# Patient Record
Sex: FEMALE | Race: WHITE | NOT HISPANIC OR LATINO | Employment: FULL TIME | URBAN - METROPOLITAN AREA
[De-identification: names, ages, dates, MRNs, and addresses within clinical notes are randomized per-mention and may not be internally consistent; named-entity substitution may affect disease eponyms.]

---
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
## 2022-04-19 ENCOUNTER — EMPLOYEE HEALTH (OUTPATIENT)
Dept: OCCUPATIONAL MEDICINE | Facility: CLINIC | Age: 22
End: 2022-04-19
Payer: COMMERCIAL

## 2022-04-19 ENCOUNTER — HOSPITAL ENCOUNTER (OUTPATIENT)
Facility: MEDICAL CENTER | Age: 22
End: 2022-04-19
Attending: PREVENTIVE MEDICINE
Payer: COMMERCIAL

## 2022-04-19 ENCOUNTER — EH NON-PROVIDER (OUTPATIENT)
Dept: OCCUPATIONAL MEDICINE | Facility: CLINIC | Age: 22
End: 2022-04-19
Payer: COMMERCIAL

## 2022-04-19 DIAGNOSIS — Z02.89 ENCOUNTER FOR OCCUPATIONAL HEALTH ASSESSMENT: Primary | ICD-10-CM

## 2022-04-19 DIAGNOSIS — Z02.1 PRE-EMPLOYMENT HEALTH SCREENING EXAMINATION: ICD-10-CM

## 2022-04-19 DIAGNOSIS — Z02.89 ENCOUNTER FOR OCCUPATIONAL HEALTH ASSESSMENT: ICD-10-CM

## 2022-04-19 LAB
AMP AMPHETAMINE: NORMAL
BAR BARBITURATES: NORMAL
BZO BENZODIAZEPINES: NORMAL
COC COCAINE: NORMAL
INT CON NEG: NORMAL
INT CON POS: NORMAL
MDMA ECSTASY: NORMAL
MET METHAMPHETAMINES: NORMAL
MTD METHADONE: NORMAL
OPI OPIATES: NORMAL
OXY OXYCODONE: NORMAL
PCP PHENCYCLIDINE: NORMAL
POC URINE DRUG SCREEN OCDRS: NORMAL
THC: NORMAL

## 2022-04-19 PROCEDURE — 86480 TB TEST CELL IMMUN MEASURE: CPT | Performed by: NURSE PRACTITIONER

## 2022-04-19 PROCEDURE — 8915 PR COMPREHENSIVE PHYSICAL: Performed by: NURSE PRACTITIONER

## 2022-04-19 PROCEDURE — 80305 DRUG TEST PRSMV DIR OPT OBS: CPT | Performed by: NURSE PRACTITIONER

## 2022-04-20 LAB
GAMMA INTERFERON BACKGROUND BLD IA-ACNC: 0.05 IU/ML
M TB IFN-G BLD-IMP: NEGATIVE
M TB IFN-G CD4+ BCKGRND COR BLD-ACNC: 0 IU/ML
MITOGEN IGNF BCKGRD COR BLD-ACNC: >10 IU/ML
QFT TB2 - NIL TBQ2: 0 IU/ML

## 2022-05-16 ENCOUNTER — TELEPHONE (OUTPATIENT)
Dept: SCHEDULING | Facility: IMAGING CENTER | Age: 22
End: 2022-05-16
Payer: COMMERCIAL

## 2022-05-17 ENCOUNTER — OFFICE VISIT (OUTPATIENT)
Dept: MEDICAL GROUP | Facility: LAB | Age: 22
End: 2022-05-17
Payer: COMMERCIAL

## 2022-05-17 ENCOUNTER — HOSPITAL ENCOUNTER (OUTPATIENT)
Dept: LAB | Facility: MEDICAL CENTER | Age: 22
End: 2022-05-17
Attending: PHYSICIAN ASSISTANT
Payer: COMMERCIAL

## 2022-05-17 VITALS
WEIGHT: 142.2 LBS | SYSTOLIC BLOOD PRESSURE: 132 MMHG | OXYGEN SATURATION: 99 % | BODY MASS INDEX: 23.69 KG/M2 | DIASTOLIC BLOOD PRESSURE: 72 MMHG | RESPIRATION RATE: 16 BRPM | TEMPERATURE: 97 F | HEART RATE: 94 BPM | HEIGHT: 65 IN

## 2022-05-17 DIAGNOSIS — N92.0 MENORRHAGIA WITH REGULAR CYCLE: ICD-10-CM

## 2022-05-17 DIAGNOSIS — Z76.89 ENCOUNTER TO ESTABLISH CARE: ICD-10-CM

## 2022-05-17 DIAGNOSIS — R55 SYNCOPE, UNSPECIFIED SYNCOPE TYPE: Primary | ICD-10-CM

## 2022-05-17 DIAGNOSIS — F50.01 ANOREXIA NERVOSA, RESTRICTING TYPE: ICD-10-CM

## 2022-05-17 DIAGNOSIS — F33.1 MODERATE EPISODE OF RECURRENT MAJOR DEPRESSIVE DISORDER (HCC): ICD-10-CM

## 2022-05-17 DIAGNOSIS — R53.83 FATIGUE, UNSPECIFIED TYPE: ICD-10-CM

## 2022-05-17 PROBLEM — N92.1 MENORRHAGIA WITH IRREGULAR CYCLE: Status: ACTIVE | Noted: 2022-05-17

## 2022-05-17 LAB
ALBUMIN SERPL BCP-MCNC: 4.5 G/DL (ref 3.2–4.9)
ALBUMIN/GLOB SERPL: 1.9 G/DL
ALP SERPL-CCNC: 71 U/L (ref 30–99)
ALT SERPL-CCNC: 6 U/L (ref 2–50)
ANION GAP SERPL CALC-SCNC: 13 MMOL/L (ref 7–16)
AST SERPL-CCNC: 8 U/L (ref 12–45)
BASOPHILS # BLD AUTO: 0.9 % (ref 0–1.8)
BASOPHILS # BLD: 0.05 K/UL (ref 0–0.12)
BILIRUB SERPL-MCNC: 0.5 MG/DL (ref 0.1–1.5)
BUN SERPL-MCNC: 8 MG/DL (ref 8–22)
CALCIUM SERPL-MCNC: 9.2 MG/DL (ref 8.5–10.5)
CHLORIDE SERPL-SCNC: 106 MMOL/L (ref 96–112)
CO2 SERPL-SCNC: 22 MMOL/L (ref 20–33)
CREAT SERPL-MCNC: 0.77 MG/DL (ref 0.5–1.4)
EOSINOPHIL # BLD AUTO: 0.22 K/UL (ref 0–0.51)
EOSINOPHIL NFR BLD: 4.1 % (ref 0–6.9)
ERYTHROCYTE [DISTWIDTH] IN BLOOD BY AUTOMATED COUNT: 36.7 FL (ref 35.9–50)
FERRITIN SERPL-MCNC: 6.8 NG/ML (ref 10–291)
GFR SERPLBLD CREATININE-BSD FMLA CKD-EPI: 113 ML/MIN/1.73 M 2
GLOBULIN SER CALC-MCNC: 2.4 G/DL (ref 1.9–3.5)
GLUCOSE SERPL-MCNC: 80 MG/DL (ref 65–99)
HCT VFR BLD AUTO: 29.2 % (ref 37–47)
HGB BLD-MCNC: 9.3 G/DL (ref 12–16)
IMM GRANULOCYTES # BLD AUTO: 0 K/UL (ref 0–0.11)
IMM GRANULOCYTES NFR BLD AUTO: 0 % (ref 0–0.9)
IRON SATN MFR SERPL: 3 % (ref 15–55)
IRON SERPL-MCNC: 12 UG/DL (ref 40–170)
LYMPHOCYTES # BLD AUTO: 1.53 K/UL (ref 1–4.8)
LYMPHOCYTES NFR BLD: 28.2 % (ref 22–41)
MCH RBC QN AUTO: 25.8 PG (ref 27–33)
MCHC RBC AUTO-ENTMCNC: 31.8 G/DL (ref 33.6–35)
MCV RBC AUTO: 80.9 FL (ref 81.4–97.8)
MONOCYTES # BLD AUTO: 0.53 K/UL (ref 0–0.85)
MONOCYTES NFR BLD AUTO: 9.8 % (ref 0–13.4)
NEUTROPHILS # BLD AUTO: 3.1 K/UL (ref 2–7.15)
NEUTROPHILS NFR BLD: 57 % (ref 44–72)
NRBC # BLD AUTO: 0 K/UL
NRBC BLD-RTO: 0 /100 WBC
PLATELET # BLD AUTO: 310 K/UL (ref 164–446)
PMV BLD AUTO: 11.5 FL (ref 9–12.9)
POTASSIUM SERPL-SCNC: 3.4 MMOL/L (ref 3.6–5.5)
PROT SERPL-MCNC: 6.9 G/DL (ref 6–8.2)
RBC # BLD AUTO: 3.61 M/UL (ref 4.2–5.4)
SODIUM SERPL-SCNC: 141 MMOL/L (ref 135–145)
T3FREE SERPL-MCNC: 3.79 PG/ML (ref 2–4.4)
T4 FREE SERPL-MCNC: 1.52 NG/DL (ref 0.93–1.7)
TIBC SERPL-MCNC: 375 UG/DL (ref 250–450)
TSH SERPL DL<=0.005 MIU/L-ACNC: 1.79 UIU/ML (ref 0.38–5.33)
UIBC SERPL-MCNC: 363 UG/DL (ref 110–370)
WBC # BLD AUTO: 5.4 K/UL (ref 4.8–10.8)

## 2022-05-17 PROCEDURE — 84443 ASSAY THYROID STIM HORMONE: CPT

## 2022-05-17 PROCEDURE — 99204 OFFICE O/P NEW MOD 45 MIN: CPT | Performed by: PHYSICIAN ASSISTANT

## 2022-05-17 PROCEDURE — 82306 VITAMIN D 25 HYDROXY: CPT

## 2022-05-17 PROCEDURE — 85025 COMPLETE CBC W/AUTO DIFF WBC: CPT

## 2022-05-17 PROCEDURE — 84439 ASSAY OF FREE THYROXINE: CPT

## 2022-05-17 PROCEDURE — 36415 COLL VENOUS BLD VENIPUNCTURE: CPT

## 2022-05-17 PROCEDURE — 84481 FREE ASSAY (FT-3): CPT

## 2022-05-17 PROCEDURE — 82728 ASSAY OF FERRITIN: CPT

## 2022-05-17 PROCEDURE — 83550 IRON BINDING TEST: CPT

## 2022-05-17 PROCEDURE — 80053 COMPREHEN METABOLIC PANEL: CPT

## 2022-05-17 PROCEDURE — 93000 ELECTROCARDIOGRAM COMPLETE: CPT | Performed by: PHYSICIAN ASSISTANT

## 2022-05-17 PROCEDURE — 83540 ASSAY OF IRON: CPT

## 2022-05-17 RX ORDER — ALPRAZOLAM 0.5 MG/1
0.5 TABLET ORAL 3 TIMES DAILY PRN
COMMUNITY

## 2022-05-17 RX ORDER — MOXIFLOXACIN 5 MG/ML
SOLUTION/ DROPS OPHTHALMIC 3 TIMES DAILY
COMMUNITY
Start: 2022-05-16 | End: 2022-05-23

## 2022-05-17 SDOH — ECONOMIC STABILITY: INCOME INSECURITY: IN THE LAST 12 MONTHS, WAS THERE A TIME WHEN YOU WERE NOT ABLE TO PAY THE MORTGAGE OR RENT ON TIME?: NO

## 2022-05-17 SDOH — ECONOMIC STABILITY: HOUSING INSECURITY
IN THE LAST 12 MONTHS, WAS THERE A TIME WHEN YOU DID NOT HAVE A STEADY PLACE TO SLEEP OR SLEPT IN A SHELTER (INCLUDING NOW)?: NO

## 2022-05-17 SDOH — ECONOMIC STABILITY: TRANSPORTATION INSECURITY
IN THE PAST 12 MONTHS, HAS THE LACK OF TRANSPORTATION KEPT YOU FROM MEDICAL APPOINTMENTS OR FROM GETTING MEDICATIONS?: NO

## 2022-05-17 SDOH — HEALTH STABILITY: MENTAL HEALTH
STRESS IS WHEN SOMEONE FEELS TENSE, NERVOUS, ANXIOUS, OR CAN'T SLEEP AT NIGHT BECAUSE THEIR MIND IS TROUBLED. HOW STRESSED ARE YOU?: VERY MUCH

## 2022-05-17 SDOH — HEALTH STABILITY: PHYSICAL HEALTH: ON AVERAGE, HOW MANY MINUTES DO YOU ENGAGE IN EXERCISE AT THIS LEVEL?: 30 MIN

## 2022-05-17 SDOH — HEALTH STABILITY: PHYSICAL HEALTH: ON AVERAGE, HOW MANY DAYS PER WEEK DO YOU ENGAGE IN MODERATE TO STRENUOUS EXERCISE (LIKE A BRISK WALK)?: 3 DAYS

## 2022-05-17 SDOH — ECONOMIC STABILITY: TRANSPORTATION INSECURITY
IN THE PAST 12 MONTHS, HAS LACK OF TRANSPORTATION KEPT YOU FROM MEETINGS, WORK, OR FROM GETTING THINGS NEEDED FOR DAILY LIVING?: NO

## 2022-05-17 SDOH — ECONOMIC STABILITY: HOUSING INSECURITY: IN THE LAST 12 MONTHS, HOW MANY PLACES HAVE YOU LIVED?: 2

## 2022-05-17 SDOH — ECONOMIC STABILITY: FOOD INSECURITY: WITHIN THE PAST 12 MONTHS, THE FOOD YOU BOUGHT JUST DIDN'T LAST AND YOU DIDN'T HAVE MONEY TO GET MORE.: NEVER TRUE

## 2022-05-17 SDOH — ECONOMIC STABILITY: TRANSPORTATION INSECURITY
IN THE PAST 12 MONTHS, HAS LACK OF RELIABLE TRANSPORTATION KEPT YOU FROM MEDICAL APPOINTMENTS, MEETINGS, WORK OR FROM GETTING THINGS NEEDED FOR DAILY LIVING?: NO

## 2022-05-17 SDOH — ECONOMIC STABILITY: INCOME INSECURITY: HOW HARD IS IT FOR YOU TO PAY FOR THE VERY BASICS LIKE FOOD, HOUSING, MEDICAL CARE, AND HEATING?: SOMEWHAT HARD

## 2022-05-17 SDOH — ECONOMIC STABILITY: FOOD INSECURITY: WITHIN THE PAST 12 MONTHS, YOU WORRIED THAT YOUR FOOD WOULD RUN OUT BEFORE YOU GOT MONEY TO BUY MORE.: NEVER TRUE

## 2022-05-17 ASSESSMENT — LIFESTYLE VARIABLES
SKIP TO QUESTIONS 9-10: 1
HOW OFTEN DO YOU HAVE A DRINK CONTAINING ALCOHOL: NEVER
AUDIT-C TOTAL SCORE: 0
HOW MANY STANDARD DRINKS CONTAINING ALCOHOL DO YOU HAVE ON A TYPICAL DAY: PATIENT DOES NOT DRINK
HOW OFTEN DO YOU HAVE SIX OR MORE DRINKS ON ONE OCCASION: NEVER

## 2022-05-17 ASSESSMENT — SOCIAL DETERMINANTS OF HEALTH (SDOH)
HOW OFTEN DO YOU GET TOGETHER WITH FRIENDS OR RELATIVES?: ONCE A WEEK
HOW OFTEN DO YOU HAVE A DRINK CONTAINING ALCOHOL: NEVER
ARE YOU MARRIED, WIDOWED, DIVORCED, SEPARATED, NEVER MARRIED, OR LIVING WITH A PARTNER?: NEVER MARRIED
HOW MANY DRINKS CONTAINING ALCOHOL DO YOU HAVE ON A TYPICAL DAY WHEN YOU ARE DRINKING: PATIENT DOES NOT DRINK
HOW OFTEN DO YOU ATTENT MEETINGS OF THE CLUB OR ORGANIZATION YOU BELONG TO?: NEVER
IN A TYPICAL WEEK, HOW MANY TIMES DO YOU TALK ON THE PHONE WITH FAMILY, FRIENDS, OR NEIGHBORS?: NEVER
DO YOU BELONG TO ANY CLUBS OR ORGANIZATIONS SUCH AS CHURCH GROUPS UNIONS, FRATERNAL OR ATHLETIC GROUPS, OR SCHOOL GROUPS?: NO
ARE YOU MARRIED, WIDOWED, DIVORCED, SEPARATED, NEVER MARRIED, OR LIVING WITH A PARTNER?: NEVER MARRIED
HOW OFTEN DO YOU HAVE SIX OR MORE DRINKS ON ONE OCCASION: NEVER
WITHIN THE PAST 12 MONTHS, YOU WORRIED THAT YOUR FOOD WOULD RUN OUT BEFORE YOU GOT THE MONEY TO BUY MORE: NEVER TRUE
IN A TYPICAL WEEK, HOW MANY TIMES DO YOU TALK ON THE PHONE WITH FAMILY, FRIENDS, OR NEIGHBORS?: NEVER
HOW OFTEN DO YOU GET TOGETHER WITH FRIENDS OR RELATIVES?: ONCE A WEEK
HOW OFTEN DO YOU ATTEND CHURCH OR RELIGIOUS SERVICES?: NEVER
HOW OFTEN DO YOU ATTENT MEETINGS OF THE CLUB OR ORGANIZATION YOU BELONG TO?: NEVER
HOW OFTEN DO YOU ATTEND CHURCH OR RELIGIOUS SERVICES?: NEVER
HOW HARD IS IT FOR YOU TO PAY FOR THE VERY BASICS LIKE FOOD, HOUSING, MEDICAL CARE, AND HEATING?: SOMEWHAT HARD
DO YOU BELONG TO ANY CLUBS OR ORGANIZATIONS SUCH AS CHURCH GROUPS UNIONS, FRATERNAL OR ATHLETIC GROUPS, OR SCHOOL GROUPS?: NO

## 2022-05-17 ASSESSMENT — PATIENT HEALTH QUESTIONNAIRE - PHQ9
SUM OF ALL RESPONSES TO PHQ QUESTIONS 1-9: 10
5. POOR APPETITE OR OVEREATING: 3 - NEARLY EVERY DAY
CLINICAL INTERPRETATION OF PHQ2 SCORE: 3

## 2022-05-17 NOTE — LETTER
[facility]  Ella Todd P.A.-C.  [address]  [place] [address]  Fax: [phone]   Authorization for Release/Disclosure of   Protected Health Information   Name: MARIAH PALOMO : 2000 SSN: [id number]   Address: [address] [place] [address] Phone:    [phone] (home)    I authorize the entity listed below to release/disclose the PHI below to:   [facility]/Ella Todd P.A.-C. and Ella Todd P.A.-C.   Provider or Entity Name:  Jhonatan Hartman   Address   City, State, Zip   Phone:      Fax:     Reason for request: continuity of care   Information to be released:    [  ] LAST COLONOSCOPY,  including any PATH REPORT and follow-up  [  ] LAST FIT/COLOGUARD RESULT [  ] LAST DEXA  [  ] LAST MAMMOGRAM  [  ] LAST PAP  [  ] LAST LABS [  ] RETINA EXAM REPORT  [  ] IMMUNIZATION RECORDS  [ xxx ] Release all info      [  ] Check here and initial the line next to each item to release ALL health information INCLUDING  _____ Care and treatment for drug and / or alcohol abuse  _____ HIV testing, infection status, or AIDS  _____ Genetic Testing    DATES OF SERVICE OR TIME PERIOD TO BE DISCLOSED: _____________  I understand and acknowledge that:  * This Authorization may be revoked at any time by you in writing, except if your health information has already been used or disclosed.  * Your health information that will be used or disclosed as a result of you signing this authorization could be re-disclosed by the recipient. If this occurs, your re-disclosed health information may no longer be protected by State or Federal laws.  * You may refuse to sign this Authorization. Your refusal will not affect your ability to obtain treatment.  * This Authorization becomes effective upon signing and will  on (date) __________.      If no date is indicated, this Authorization will  one (1) year from the signature date.    Name: Mariah BERNSTEIN  Sabina    Signature:   Date:     5/17/2022       PLEASE FAX REQUESTED RECORDS BACK TO: [phone]

## 2022-05-17 NOTE — ASSESSMENT & PLAN NOTE
New to me, patient presents today complaining of heavy menstrual bleeding.  States that she has had a history of iron deficiency anemia in the past.  Has had 2 episodes of syncope in the last week.  Denies any chest pain or shortness of breath.  No palpitations.    Patient does have a Nexplanon implanted.  No concerns with this

## 2022-05-18 ENCOUNTER — PATIENT MESSAGE (OUTPATIENT)
Dept: MEDICAL GROUP | Facility: LAB | Age: 22
End: 2022-05-18
Payer: COMMERCIAL

## 2022-05-18 RX ORDER — DIPHENHYDRAMINE HYDROCHLORIDE 50 MG/ML
50 INJECTION INTRAMUSCULAR; INTRAVENOUS PRN
Status: CANCELLED | OUTPATIENT
Start: 2022-05-18

## 2022-05-18 RX ORDER — EPINEPHRINE 1 MG/ML(1)
0.5 AMPUL (ML) INJECTION PRN
Status: CANCELLED | OUTPATIENT
Start: 2022-05-18

## 2022-05-18 RX ORDER — METHYLPREDNISOLONE SODIUM SUCCINATE 125 MG/2ML
125 INJECTION, POWDER, LYOPHILIZED, FOR SOLUTION INTRAMUSCULAR; INTRAVENOUS PRN
Status: CANCELLED | OUTPATIENT
Start: 2022-05-18

## 2022-05-18 RX ORDER — SODIUM CHLORIDE 9 MG/ML
INJECTION, SOLUTION INTRAVENOUS CONTINUOUS
Status: CANCELLED | OUTPATIENT
Start: 2022-05-18

## 2022-05-19 ENCOUNTER — APPOINTMENT (OUTPATIENT)
Dept: RADIOLOGY | Facility: MEDICAL CENTER | Age: 22
End: 2022-05-19
Attending: EMERGENCY MEDICINE
Payer: COMMERCIAL

## 2022-05-19 ENCOUNTER — HOSPITAL ENCOUNTER (EMERGENCY)
Facility: MEDICAL CENTER | Age: 22
End: 2022-05-19
Attending: EMERGENCY MEDICINE
Payer: COMMERCIAL

## 2022-05-19 VITALS
TEMPERATURE: 97.9 F | OXYGEN SATURATION: 99 % | WEIGHT: 149.69 LBS | HEART RATE: 69 BPM | DIASTOLIC BLOOD PRESSURE: 69 MMHG | RESPIRATION RATE: 18 BRPM | BODY MASS INDEX: 24.94 KG/M2 | SYSTOLIC BLOOD PRESSURE: 122 MMHG | HEIGHT: 65 IN

## 2022-05-19 DIAGNOSIS — R10.9 ABDOMINAL PAIN, UNSPECIFIED ABDOMINAL LOCATION: ICD-10-CM

## 2022-05-19 DIAGNOSIS — I95.1 ORTHOSTATIC SYNCOPE: ICD-10-CM

## 2022-05-19 LAB
ALBUMIN SERPL BCP-MCNC: 4.3 G/DL (ref 3.2–4.9)
ALBUMIN/GLOB SERPL: 1.5 G/DL
ALP SERPL-CCNC: 73 U/L (ref 30–99)
ALT SERPL-CCNC: 7 U/L (ref 2–50)
AMPHET UR QL SCN: NEGATIVE
ANION GAP SERPL CALC-SCNC: 14 MMOL/L (ref 7–16)
ANISOCYTOSIS BLD QL SMEAR: ABNORMAL
APPEARANCE UR: CLEAR
AST SERPL-CCNC: 10 U/L (ref 12–45)
BACTERIA #/AREA URNS HPF: ABNORMAL /HPF
BARBITURATES UR QL SCN: NEGATIVE
BASOPHILS # BLD AUTO: 2.6 % (ref 0–1.8)
BASOPHILS # BLD: 0.19 K/UL (ref 0–0.12)
BENZODIAZ UR QL SCN: NEGATIVE
BILIRUB SERPL-MCNC: 0.4 MG/DL (ref 0.1–1.5)
BILIRUB UR QL STRIP.AUTO: NEGATIVE
BUN SERPL-MCNC: 6 MG/DL (ref 8–22)
BZE UR QL SCN: NEGATIVE
CALCIUM SERPL-MCNC: 9.1 MG/DL (ref 8.5–10.5)
CANNABINOIDS UR QL SCN: NEGATIVE
CHLORIDE SERPL-SCNC: 113 MMOL/L (ref 96–112)
CO2 SERPL-SCNC: 20 MMOL/L (ref 20–33)
COLOR UR: YELLOW
CREAT SERPL-MCNC: 0.69 MG/DL (ref 0.5–1.4)
EKG IMPRESSION: NORMAL
EOSINOPHIL # BLD AUTO: 0.12 K/UL (ref 0–0.51)
EOSINOPHIL NFR BLD: 1.7 % (ref 0–6.9)
EPI CELLS #/AREA URNS HPF: ABNORMAL /HPF
ERYTHROCYTE [DISTWIDTH] IN BLOOD BY AUTOMATED COUNT: 35.8 FL (ref 35.9–50)
ETHANOL BLD-MCNC: <10.1 MG/DL
GFR SERPLBLD CREATININE-BSD FMLA CKD-EPI: 128 ML/MIN/1.73 M 2
GIANT PLATELETS BLD QL SMEAR: NORMAL
GLOBULIN SER CALC-MCNC: 2.9 G/DL (ref 1.9–3.5)
GLUCOSE SERPL-MCNC: 89 MG/DL (ref 65–99)
GLUCOSE UR STRIP.AUTO-MCNC: NEGATIVE MG/DL
HCG SERPL QL: NEGATIVE
HCT VFR BLD AUTO: 30.5 % (ref 37–47)
HGB BLD-MCNC: 9.8 G/DL (ref 12–16)
KETONES UR STRIP.AUTO-MCNC: 40 MG/DL
LEUKOCYTE ESTERASE UR QL STRIP.AUTO: ABNORMAL
LIPASE SERPL-CCNC: 30 U/L (ref 11–82)
LYMPHOCYTES # BLD AUTO: 1.5 K/UL (ref 1–4.8)
LYMPHOCYTES NFR BLD: 20.9 % (ref 22–41)
MANUAL DIFF BLD: NORMAL
MCH RBC QN AUTO: 25.7 PG (ref 27–33)
MCHC RBC AUTO-ENTMCNC: 32.1 G/DL (ref 33.6–35)
MCV RBC AUTO: 79.8 FL (ref 81.4–97.8)
METHADONE UR QL SCN: NEGATIVE
MICRO URNS: ABNORMAL
MICROCYTES BLD QL SMEAR: ABNORMAL
MONOCYTES # BLD AUTO: 0.25 K/UL (ref 0–0.85)
MONOCYTES NFR BLD AUTO: 3.5 % (ref 0–13.4)
MORPHOLOGY BLD-IMP: NORMAL
NEUTROPHILS # BLD AUTO: 5.13 K/UL (ref 2–7.15)
NEUTROPHILS NFR BLD: 71.3 % (ref 44–72)
NITRITE UR QL STRIP.AUTO: NEGATIVE
NRBC # BLD AUTO: 0 K/UL
NRBC BLD-RTO: 0 /100 WBC
OPIATES UR QL SCN: NEGATIVE
OXYCODONE UR QL SCN: NEGATIVE
PCP UR QL SCN: NEGATIVE
PH UR STRIP.AUTO: 6 [PH] (ref 5–8)
PHOSPHATE SERPL-MCNC: 3.7 MG/DL (ref 2.5–4.5)
PLATELET # BLD AUTO: 330 K/UL (ref 164–446)
PLATELET BLD QL SMEAR: NORMAL
PMV BLD AUTO: 11 FL (ref 9–12.9)
POIKILOCYTOSIS BLD QL SMEAR: NORMAL
POTASSIUM SERPL-SCNC: 3.7 MMOL/L (ref 3.6–5.5)
PROPOXYPH UR QL SCN: NEGATIVE
PROT SERPL-MCNC: 7.2 G/DL (ref 6–8.2)
PROT UR QL STRIP: 30 MG/DL
RBC # BLD AUTO: 3.82 M/UL (ref 4.2–5.4)
RBC # URNS HPF: ABNORMAL /HPF
RBC BLD AUTO: PRESENT
RBC UR QL AUTO: NEGATIVE
SODIUM SERPL-SCNC: 147 MMOL/L (ref 135–145)
SP GR UR STRIP.AUTO: 1.03
UROBILINOGEN UR STRIP.AUTO-MCNC: 1 MG/DL
WBC # BLD AUTO: 7.2 K/UL (ref 4.8–10.8)
WBC #/AREA URNS HPF: ABNORMAL /HPF

## 2022-05-19 PROCEDURE — 70450 CT HEAD/BRAIN W/O DYE: CPT

## 2022-05-19 PROCEDURE — 80307 DRUG TEST PRSMV CHEM ANLYZR: CPT

## 2022-05-19 PROCEDURE — 80053 COMPREHEN METABOLIC PANEL: CPT

## 2022-05-19 PROCEDURE — 700105 HCHG RX REV CODE 258: Performed by: EMERGENCY MEDICINE

## 2022-05-19 PROCEDURE — 84703 CHORIONIC GONADOTROPIN ASSAY: CPT

## 2022-05-19 PROCEDURE — 81001 URINALYSIS AUTO W/SCOPE: CPT

## 2022-05-19 PROCEDURE — 84100 ASSAY OF PHOSPHORUS: CPT

## 2022-05-19 PROCEDURE — 36415 COLL VENOUS BLD VENIPUNCTURE: CPT

## 2022-05-19 PROCEDURE — 93005 ELECTROCARDIOGRAM TRACING: CPT

## 2022-05-19 PROCEDURE — 74176 CT ABD & PELVIS W/O CONTRAST: CPT

## 2022-05-19 PROCEDURE — 82077 ASSAY SPEC XCP UR&BREATH IA: CPT

## 2022-05-19 PROCEDURE — 99285 EMERGENCY DEPT VISIT HI MDM: CPT

## 2022-05-19 PROCEDURE — 85025 COMPLETE CBC W/AUTO DIFF WBC: CPT

## 2022-05-19 PROCEDURE — 85007 BL SMEAR W/DIFF WBC COUNT: CPT

## 2022-05-19 PROCEDURE — 93005 ELECTROCARDIOGRAM TRACING: CPT | Performed by: EMERGENCY MEDICINE

## 2022-05-19 PROCEDURE — 87491 CHLMYD TRACH DNA AMP PROBE: CPT

## 2022-05-19 PROCEDURE — 83690 ASSAY OF LIPASE: CPT

## 2022-05-19 PROCEDURE — 87591 N.GONORRHOEAE DNA AMP PROB: CPT

## 2022-05-19 RX ORDER — SODIUM CHLORIDE 9 MG/ML
1000 INJECTION, SOLUTION INTRAVENOUS ONCE
Status: COMPLETED | OUTPATIENT
Start: 2022-05-19 | End: 2022-05-19

## 2022-05-19 RX ADMIN — SODIUM CHLORIDE 1000 ML: 9 INJECTION, SOLUTION INTRAVENOUS at 21:29

## 2022-05-19 ASSESSMENT — FIBROSIS 4 INDEX: FIB4 SCORE: 0.2

## 2022-05-20 LAB
C TRACH DNA SPEC QL NAA+PROBE: NEGATIVE
N GONORRHOEA DNA SPEC QL NAA+PROBE: NEGATIVE
SPECIMEN SOURCE: NORMAL

## 2022-05-20 NOTE — DISCHARGE INSTRUCTIONS
You were seen in the ED for syncope (loss of consciousness). Your medical evaluation, physical exam and EKG were reassuring. At this time, the cause of your syncope does not appear to be dangerous. Please drink plenty of fluids and eat regular meals. Please take care when getting up from sitting or standing.   Please return to the ED or seek medical attention if you develop:  - Chest pain  - Shortness of breath  - Loss of consciousness  - Other concerning findings  Please follow up with your regular doctor.

## 2022-05-20 NOTE — ED TRIAGE NOTES
"Chief Complaint   Patient presents with   • Syncope     Pt to ED from home c/o frequent syncopal episodes for approx 3 months that have been increasing in frequency. Pt has PMHx anemia, describes disordered eating in triage    BP (!) 156/89   Pulse (!) 123   Temp 37.6 °C (99.6 °F) (Temporal)   Resp 18   Ht 1.651 m (5' 5\")   Wt 67.9 kg (149 lb 11.1 oz)   SpO2 98%   BMI 24.91 kg/m²      "

## 2022-05-20 NOTE — ED NOTES
PT given AVS. PT acknowledges discharge instructions. PIV removed. PT ambulates with steady gait to lobby.

## 2022-05-20 NOTE — ED NOTES
Per PT mother, PT had a GLF Monday 3/16 and was discovered this afternoon between 15:00-16:00 with ALOC after sustaining a GLF (last known well time this morning ~09:00) onto hardwood in home. PT refused EMS transport and was brought in by mother.     PT complains of headache 2/10 (0-10 numeric).     No obvious deformity/ trauma to head or neck.     PT is AxO4, answers questions appropriately, follows commands appropriately.

## 2022-05-20 NOTE — ED PROVIDER NOTES
"ED Provider Note    Scribed for Jonathan Slater M.D. by Terrance Funez. 5/19/2022,  8:06 PM.    Means of Arrival: Walk in  History obtained from: Patient  History limited by: None    CHIEF COMPLAINT  Chief Complaint   Patient presents with   • Syncope       HPI  Mariah Muhammad is a 21 y.o. female who presents to the Emergency Department for evaluation of moderate syncopal episodes onset approximately 1 month ago. The patient states that she began experiencing syncopal episodes 1 months ago while living in [place] and was found to be anemic. After moving to [place] she notes a short period where she wasn't experiencing any syncopal episodes, but over the past month has noticed an increased frequency in syncopal episodes which has prompted her visit to the ED. Most recently the patient has had at least 5 syncopal episodes over the past week including head strike during many of the episodes. She reports that the episodes appear to be triggered by standing up. Associated symptoms include neck pain, loss of consciousness, prolonged bleeding from menstrual cycle, abdominal pain, dizziness, decreased PO intake, and decreased fluid intake. She localizes the abdominal pain to her right upper quadrant. Additionally, the patient reports her menstrual cycle lasting approximately 2 weeks with heavy bleeding that has recently begun to \"slow down.\" The patient denies any headache, dysuria, hematochezia, or bilateral lower extremity swelling. Patient notes that she doesn't eat or drink much because she \"thinks about the calories.\" She currently has Nexplanon for birth controlled injected into her left arm. The patient has a history of anemia and constipation. She is compliant with daily iron supplements. No alleviating or exacerbating factors noted.     REVIEW OF SYSTEMS  Gen: No fever  CV: Syncope  Resp: No cough  Abd: Abdominal pain  See HPI for further details.   All other systems are negative.     PAST MEDICAL HISTORY  Past " "Medical History:   Diagnosis Date   • Anemia    • Eczema        FAMILY HISTORY  History reviewed. No pertinent family history.    SOCIAL HISTORY   reports that she has never smoked. She has never used smokeless tobacco. She reports that she does not drink alcohol and does not use drugs.    SURGICAL HISTORY  Past Surgical History:   Procedure Laterality Date   • ADENOIDECTOMY     • TONSILLECTOMY     • TONSILLECTOMY         CURRENT MEDICATIONS  Home Medications     Reviewed by Leticia Thurston R.N. (Registered Nurse) on 05/19/22 at 1728  Med List Status: Not Addressed   Medication Last Dose Status   ALPRAZolam (XANAX) 0.5 MG Tab  Active   etonogestrel (NEXPLANON) 68 MG Implant implant  Active   Ferrous Gluconate (IRON 27 PO)  Active   moxifloxacin (VIGAMOX) 0.5 % Solution  Active                ALLERGIES  No Known Allergies    PHYSICAL EXAM  VITAL SIGNS: BP (!) 147/87   Pulse (!) 123   Temp 37.6 °C (99.6 °F) (Temporal)   Resp 18   Ht 1.651 m (5' 5\")   Wt 67.9 kg (149 lb 11.1 oz)   SpO2 98%   BMI 24.91 kg/m²    Gen: Alert, oriented  HENT: Small frontal hematoma on the right, No racoon eyes septal hematoma, facial instability  Eye: EOMI, no chemosis, PERRL  Neck: trachea midline, no tenderness, full range of motion of the neck  Resp: CTAB, no respiratory distress, no chest wall tenderness or crepitus  CV: No JVD, RRR.  + peripheral pulses, no murmurs  Abd: soft, non-distended, diffuse tenderness to palpation worse in bilateral lower quadrants, no CVA tenderness. No ecchymosis  Back: no spinal tenderness or deformities  Ext: No deformities, no calf tenderness or pedal edema  Psych: normal mood  Neuro: speech fluent, moves all extremities. GCS 15    RADIOLOGY/PROCEDURES  CT-ABDOMEN-PELVIS W/O   Final Result         1.  No acute intra-abdominal abnormality identified.      CT-HEAD W/O   Final Result         1.  No acute intracranial abnormality.   2.  Moderate left maxillary, bilateral ethmoid, and left sphenoid " sinusitis changes             LABS  Labs Reviewed   CBC WITH DIFFERENTIAL - Abnormal; Notable for the following components:       Result Value    RBC 3.82 (*)     Hemoglobin 9.8 (*)     Hematocrit 30.5 (*)     MCV 79.8 (*)     MCH 25.7 (*)     MCHC 32.1 (*)     RDW 35.8 (*)     Lymphocytes 20.90 (*)     Basophils 2.60 (*)     Baso (Absolute) 0.19 (*)     All other components within normal limits   COMP METABOLIC PANEL - Abnormal; Notable for the following components:    Sodium 147 (*)     Chloride 113 (*)     Bun 6 (*)     AST(SGOT) 10 (*)     All other components within normal limits   URINALYSIS,CULTURE IF INDICATED - Abnormal; Notable for the following components:    Ketones 40 (*)     Protein 30 (*)     Leukocyte Esterase Trace (*)     All other components within normal limits    Narrative:     Indication for culture:->Patient WITHOUT an indwelling Medina  catheter in place with new onset of Dysuria, Frequency,  Urgency, and/or Suprapubic pain   URINE MICROSCOPIC (W/UA) - Abnormal; Notable for the following components:    Bacteria Few (*)     All other components within normal limits    Narrative:     Indication for culture:->Patient WITHOUT an indwelling Medina  catheter in place with new onset of Dysuria, Frequency,  Urgency, and/or Suprapubic pain   LIPASE   HCG QUAL SERUM   DIAGNOSTIC ALCOHOL   URINE DRUG SCREEN    Narrative:     Indication for culture:->Patient WITHOUT an indwelling Medina  catheter in place with new onset of Dysuria, Frequency,  Urgency, and/or Suprapubic pain   PHOSPHORUS   CHLAMYDIA/GC, PCR (URINE)    Narrative:     Indication for culture:->Patient WITHOUT an indwelling Medina  catheter in place with new onset of Dysuria, Frequency,  Urgency, and/or Suprapubic pain   ESTIMATED GFR   DIFFERENTIAL MANUAL   PERIPHERAL SMEAR REVIEW   PLATELET ESTIMATE   MORPHOLOGY        EKG  Results for orders placed or performed during the hospital encounter of 05/19/22   EKG   Result Value Ref Range    Report        [facility] Emergency Dept.    Test Date:  2022  Pt Name:    ALLISON PALOMO             Department: ER  MRN:        [id number]                      Room:  Gender:     Female                       Technician: [id number]  :        2000                   Requested By:ER TRIAGE PROTOCOL  Order #:    [id number]                    Reading MD: Jonathan Slater    Measurements  Intervals                                Axis  Rate:       95                           P:          70  SD:         140                          QRS:        46  QRSD:       84                           T:          -20  QT:         320  QTc:        403    Interpretive Statements  SINUS RHYTHM  BORDERLINE T ABNORMALITIES, DIFFUSE LEADS  No previous ECG available for comparison  Electronically Signed On 2022 19:55:40 PDT by Jonathan Slater          COURSE & MEDICAL DECISION MAKING  Pertinent Labs & Imaging studies reviewed. (See chart for details)    Patient presents with symptoms concerning for orthostatic syncope. There are no high risk syncope features, including family history of sudden death or drowning, murmur, recurrent syncope, EKG suggestive of ARVD, long QT, short QT, HOCM, pre-excitation, heart block, ischemia or Brugada syndrome.  After IV fluids, pt was able to ambulate without orthostatic symptoms. They will be referred to their doctor for follow up.    Patient did have some confusion, however thankfully CAT scan of the head is reassuring.  No signs of intracranial bleed.  Despite her eating abnormalities, there is no evidence of electrolyte abnormalities.  She does have slight anemia, however this does not likely relate to her episodes of syncope today.  No stigmata of DVT/PE    Regarding patient's abdominal pain, she has no vaginal discharge, low suspicion for PID.  Will send screening gonorrhea/chlamydia just in case.  CAT scan demonstrates no abnormalities, no evidence of acute appendicitis, edema to  suggest ovarian torsion.  This likely relates to her constipation.  Labs reassuring, no leukocytosis.  Urinalysis not consistent with urinary tract infection or ureterolithiasis.    8:06 PM Patient seen and examined at bedside. Ordered for labs and imaging to evaluate. Patient will be treated with NS Infusion 1000 her symptoms. Discussed utilizing imaging and labs to evaluate the patient's presentation of symptoms, the patient is amenable to the plan of care.     10:04 PM - Ordered labs to further evaluate the patient    11:27 PM - Patient was reevaluated at bedside. Discussed lab and radiology results with the patient. Discussed plan for discharge; I advised the patient to follow-up with her PCP as soon as possible, and to return to the [facility] ED with any new or worsening symptoms. Patient was given the opportunity for questions. I addressed all questions or concerns at this time and they verbalize agreement to the plan of care.     HYDRATION: Based on the patient's presentation of Dehydration the patient was given IV fluids. IV Hydration was used because oral hydration was not adequate alone. Upon recheck following hydration, the patient was improved.    Appropriate PPE were worn at this encounter.     The patient will return for new or worsening symptoms and is stable at the time of discharge.    The patient is referred to a primary physician for blood pressure management, diabetic screening, and for all other preventative health concerns.    DISPOSITION:  Patient will be discharged home in stable condition.    FOLLOW UP:  Ella Todd P.A.-C.  [address] [address] [phone]    Schedule an appointment as soon as possible for a visit       [facility], Emergency Dept  [address] [address] [phone]    If symptoms worsen        FINAL IMPRESSION  1. Orthostatic syncope    2. Abdominal pain, unspecified abdominal location          I,  Terrance Funez (Scribe), am scribing for, and in the presence of, Jonathan Slater M.D..    Electronically signed by: Terrance Funez (Ivyibreza), 5/19/2022    IJonathan M.D. personally performed the services described in this documentation, as scribed by Terrance Funez in my presence, and it is both accurate and complete.    The note accurately reflects work and decisions made by me.  Jonathan Slater M.D.  5/20/2022  1:25 AM      This dictation was created using voice recognition software. The accuracy of the dictation is limited to the abilities of the software. I expect there may be some errors of grammar and possibly content. The nursing notes were reviewed and certain aspects of this information were incorporated into this note.

## 2022-05-21 ENCOUNTER — APPOINTMENT (OUTPATIENT)
Dept: RADIOLOGY | Facility: MEDICAL CENTER | Age: 22
End: 2022-05-21
Attending: EMERGENCY MEDICINE
Payer: COMMERCIAL

## 2022-05-21 ENCOUNTER — HOSPITAL ENCOUNTER (EMERGENCY)
Facility: MEDICAL CENTER | Age: 22
End: 2022-05-24
Attending: EMERGENCY MEDICINE
Payer: COMMERCIAL

## 2022-05-21 DIAGNOSIS — R45.851 SUICIDAL IDEATION: ICD-10-CM

## 2022-05-21 LAB
25(OH)D3 SERPL-MCNC: 19 NG/ML (ref 30–80)
ALBUMIN SERPL BCP-MCNC: 4.3 G/DL (ref 3.2–4.9)
ALBUMIN/GLOB SERPL: 1.7 G/DL
ALP SERPL-CCNC: 69 U/L (ref 30–99)
ALT SERPL-CCNC: 7 U/L (ref 2–50)
AMORPH CRY #/AREA URNS HPF: PRESENT /HPF
AMPHET UR QL SCN: NEGATIVE
ANION GAP SERPL CALC-SCNC: 12 MMOL/L (ref 7–16)
APPEARANCE UR: ABNORMAL
AST SERPL-CCNC: 18 U/L (ref 12–45)
BACTERIA #/AREA URNS HPF: NEGATIVE /HPF
BARBITURATES UR QL SCN: NEGATIVE
BASOPHILS # BLD AUTO: 1.2 % (ref 0–1.8)
BASOPHILS # BLD: 0.07 K/UL (ref 0–0.12)
BENZODIAZ UR QL SCN: NEGATIVE
BILIRUB SERPL-MCNC: 0.3 MG/DL (ref 0.1–1.5)
BILIRUB UR QL STRIP.AUTO: NEGATIVE
BUN SERPL-MCNC: 9 MG/DL (ref 8–22)
BZE UR QL SCN: NEGATIVE
CALCIUM SERPL-MCNC: 9.1 MG/DL (ref 8.5–10.5)
CANNABINOIDS UR QL SCN: NEGATIVE
CHLORIDE SERPL-SCNC: 106 MMOL/L (ref 96–112)
CO2 SERPL-SCNC: 20 MMOL/L (ref 20–33)
COLOR UR: YELLOW
CREAT SERPL-MCNC: 0.62 MG/DL (ref 0.5–1.4)
EOSINOPHIL # BLD AUTO: 0.19 K/UL (ref 0–0.51)
EOSINOPHIL NFR BLD: 3.2 % (ref 0–6.9)
EPI CELLS #/AREA URNS HPF: NORMAL /HPF
ERYTHROCYTE [DISTWIDTH] IN BLOOD BY AUTOMATED COUNT: 35.8 FL (ref 35.9–50)
GFR SERPLBLD CREATININE-BSD FMLA CKD-EPI: 131 ML/MIN/1.73 M 2
GLOBULIN SER CALC-MCNC: 2.6 G/DL (ref 1.9–3.5)
GLUCOSE SERPL-MCNC: 88 MG/DL (ref 65–99)
GLUCOSE UR STRIP.AUTO-MCNC: NEGATIVE MG/DL
HCG UR QL: NEGATIVE
HCT VFR BLD AUTO: 28.3 % (ref 37–47)
HGB BLD-MCNC: 9 G/DL (ref 12–16)
IMM GRANULOCYTES # BLD AUTO: 0.01 K/UL (ref 0–0.11)
IMM GRANULOCYTES NFR BLD AUTO: 0.2 % (ref 0–0.9)
KETONES UR STRIP.AUTO-MCNC: NEGATIVE MG/DL
LEUKOCYTE ESTERASE UR QL STRIP.AUTO: ABNORMAL
LIPASE SERPL-CCNC: 43 U/L (ref 11–82)
LYMPHOCYTES # BLD AUTO: 2.17 K/UL (ref 1–4.8)
LYMPHOCYTES NFR BLD: 36.4 % (ref 22–41)
MCH RBC QN AUTO: 25.3 PG (ref 27–33)
MCHC RBC AUTO-ENTMCNC: 31.8 G/DL (ref 33.6–35)
MCV RBC AUTO: 79.5 FL (ref 81.4–97.8)
METHADONE UR QL SCN: NEGATIVE
MICRO URNS: ABNORMAL
MONOCYTES # BLD AUTO: 0.48 K/UL (ref 0–0.85)
MONOCYTES NFR BLD AUTO: 8.1 % (ref 0–13.4)
NEUTROPHILS # BLD AUTO: 3.04 K/UL (ref 2–7.15)
NEUTROPHILS NFR BLD: 50.9 % (ref 44–72)
NITRITE UR QL STRIP.AUTO: NEGATIVE
NRBC # BLD AUTO: 0 K/UL
NRBC BLD-RTO: 0 /100 WBC
OPIATES UR QL SCN: NEGATIVE
OXYCODONE UR QL SCN: NEGATIVE
PCP UR QL SCN: NEGATIVE
PH UR STRIP.AUTO: 5.5 [PH] (ref 5–8)
PLATELET # BLD AUTO: 307 K/UL (ref 164–446)
PMV BLD AUTO: 10.8 FL (ref 9–12.9)
POTASSIUM SERPL-SCNC: 3.8 MMOL/L (ref 3.6–5.5)
PROPOXYPH UR QL SCN: NEGATIVE
PROT SERPL-MCNC: 6.9 G/DL (ref 6–8.2)
PROT UR QL STRIP: NEGATIVE MG/DL
RBC # BLD AUTO: 3.56 M/UL (ref 4.2–5.4)
RBC # URNS HPF: NORMAL /HPF
RBC UR QL AUTO: ABNORMAL
SODIUM SERPL-SCNC: 138 MMOL/L (ref 135–145)
SP GR UR STRIP.AUTO: >=1.03
UROBILINOGEN UR STRIP.AUTO-MCNC: 0.2 MG/DL
WBC # BLD AUTO: 6 K/UL (ref 4.8–10.8)
WBC #/AREA URNS HPF: NORMAL /HPF

## 2022-05-21 PROCEDURE — 700102 HCHG RX REV CODE 250 W/ 637 OVERRIDE(OP): Performed by: EMERGENCY MEDICINE

## 2022-05-21 PROCEDURE — 81025 URINE PREGNANCY TEST: CPT

## 2022-05-21 PROCEDURE — 36415 COLL VENOUS BLD VENIPUNCTURE: CPT

## 2022-05-21 PROCEDURE — A9270 NON-COVERED ITEM OR SERVICE: HCPCS | Performed by: EMERGENCY MEDICINE

## 2022-05-21 PROCEDURE — 81001 URINALYSIS AUTO W/SCOPE: CPT

## 2022-05-21 PROCEDURE — 99285 EMERGENCY DEPT VISIT HI MDM: CPT

## 2022-05-21 PROCEDURE — 85025 COMPLETE CBC W/AUTO DIFF WBC: CPT

## 2022-05-21 PROCEDURE — 83690 ASSAY OF LIPASE: CPT

## 2022-05-21 PROCEDURE — 80053 COMPREHEN METABOLIC PANEL: CPT

## 2022-05-21 PROCEDURE — 80307 DRUG TEST PRSMV CHEM ANLYZR: CPT

## 2022-05-21 PROCEDURE — 74176 CT ABD & PELVIS W/O CONTRAST: CPT

## 2022-05-21 RX ORDER — ALPRAZOLAM 0.25 MG/1
0.5 TABLET ORAL ONCE
Status: COMPLETED | OUTPATIENT
Start: 2022-05-21 | End: 2022-05-21

## 2022-05-21 RX ORDER — FERROUS SULFATE 325(65) MG
325 TABLET ORAL DAILY
Status: SHIPPED | COMMUNITY
End: 2022-08-22

## 2022-05-21 RX ADMIN — ALPRAZOLAM 0.5 MG: 0.25 TABLET ORAL at 22:29

## 2022-05-21 ASSESSMENT — FIBROSIS 4 INDEX: FIB4 SCORE: 0.22

## 2022-05-21 NOTE — ED TRIAGE NOTES
22 y/o female to triage by w/c  Chief Complaint   Patient presents with   • Abdominal Pain     Started yesterday    • Dizziness   • Nausea     Last night    • Constipation     No BM x 3 weeks      Pt states she has thought of hurting herself and killing herself by not eating. States she is not taking care of herself.  States increase stress from losing family members

## 2022-05-21 NOTE — ED NOTES
Med rec updated and complete. Allergies reviewed.  Confirmed name and date of birth.  Pt denies antibiotic use in last 30 days.      Home pharmacy [facility] [phone]

## 2022-05-21 NOTE — ED NOTES
"Pt WC to bathroom, urine sample collected and sent to lab. Breathalyzer performed, 0.00.   Pt assisted on gurney, all belongings removed from patient and room per protocol. Pt placed on monitoring. Pt reporting lower abd pain worsening last night. States has not been eating for the last week and has been feeling dizzy and nauseous. Also reports no BM for approx a week. Sometimes uses stool softeners.   Pt reports thoughts of hurting herself but does not have a plan. States has been depressed for \"a while\" due to family issues.   Chart up for ERP  "

## 2022-05-21 NOTE — ED PROVIDER NOTES
"ED Provider Note    CHIEF COMPLAINT  Chief Complaint   Patient presents with   • Abdominal Pain     Started yesterday    • Dizziness   • Nausea     Last night    • Constipation     No BM x 3 weeks    • Suicidal Ideation       HPI  Mariah Muhammad is a 21 y.o. female who presents history of chronic anemia, she reports that she has been depressed recently because her grand parent  as well as her friend and in the past she has been hospitalized for suicidal ideation.  She presents today stating that she has abdominal pain that is diffuse, she has nausea she says she has not had a bowel movement in 3 weeks.  She says that she is not eating or drinking.  She says that she is having suicidal thoughts but has no specific plan except for not eating or drinking.    REVIEW OF SYSTEMS  See HPI for further details. All other systems are negative.     PAST MEDICAL HISTORY   has a past medical history of Anemia and Eczema.    SOCIAL HISTORY  Social History     Tobacco Use   • Smoking status: Never Smoker   • Smokeless tobacco: Never Used   Vaping Use   • Vaping Use: Never used   Substance and Sexual Activity   • Alcohol use: Never   • Drug use: Never   • Sexual activity: Not on file       SURGICAL HISTORY   has a past surgical history that includes tonsillectomy; tonsillectomy; and adenoidectomy.    CURRENT MEDICATIONS  Home Medications     Reviewed by Marietta Palacio (Pharmacy Tech) on 22 at 1611  Med List Status: Complete   Medication Last Dose Status   ALPRAZolam (XANAX) 0.5 MG Tab > 1 week Active   etonogestrel (NEXPLANON) 68 MG Implant implant 2022 Active   ferrous sulfate 325 (65 Fe) MG tablet 2022 Active   moxifloxacin (VIGAMOX) 0.5 % Solution 2022 Active                  ALLERGIES  No Known Allergies    FAMILY HISTORY  The patient has no family history of anemia    PHYSICAL EXAM  VITAL SIGNS: /62   Pulse 65   Temp 36.4 °C (97.5 °F) (Temporal)   Resp 18   Ht 1.651 m (5' 5\")   Wt " 64.3 kg (141 lb 12.1 oz)   LMP 05/01/2022 (Approximate)   SpO2 97%   BMI 23.59 kg/m²  @CHIP[[id number]::@   Pulse ox interpretation: I interpret this pulse ox as normal.  Constitutional: Alert.  HENT: No signs of trauma, Bilateral external ears normal, Nose normal.   Eyes: Pupils are equal and reactive, Conjunctiva normal, Non-icteric.   Neck: Normal range of motion, No tenderness, Supple, No stridor.   Lymphatic: No lymphadenopathy noted.   Cardiovascular: Regular rate and rhythm, no murmurs.   Thorax & Lungs: Normal breath sounds, No respiratory distress, No wheezing, No chest tenderness.   Abdomen: Bowel sounds normal, Soft, No tenderness, No masses, No pulsatile masses. No peritoneal signs.  Skin: Warm, Dry, No erythema, No rash.   Back: No bony tenderness, No CVA tenderness.   Extremities: Intact distal pulses, No edema, No tenderness, No cyanosis.  Musculoskeletal: Good range of motion in all major joints. No tenderness to palpation or major deformities noted.   Neurologic: Alert , Normal motor function, Normal sensory function, No focal deficits noted.   Psychiatric: Patient is appropriate here, she is not responding to any internal stimuli.      DIAGNOSTIC STUDIES / PROCEDURES        LABS  Labs Reviewed   URINALYSIS - Abnormal; Notable for the following components:       Result Value    Character Cloudy (*)     Leukocyte Esterase Trace (*)     Occult Blood Small (*)     All other components within normal limits   CBC WITH DIFFERENTIAL - Abnormal; Notable for the following components:    RBC 3.56 (*)     Hemoglobin 9.0 (*)     Hematocrit 28.3 (*)     MCV 79.5 (*)     MCH 25.3 (*)     MCHC 31.8 (*)     RDW 35.8 (*)     All other components within normal limits    Narrative:     SPECIMEN IS A RECOLLECT   URINE DRUG SCREEN   HCG QUALITATIVE UR   COMP METABOLIC PANEL   LIPASE   URINE MICROSCOPIC (W/UA)   ESTIMATED GFR         RADIOLOGY  CT-RENAL COLIC EVALUATION(A/P W/O)   Final Result         1. No urinary tract  calculus identified. No renal collecting system dilatation.      2. No evidence of inflammatory change in the abdomen or pelvis.  The study is however limited due to nonuse of intravenous contrast              COURSE & MEDICAL DECISION MAKING  Pertinent Labs & Imaging studies reviewed. (See chart for details)    The patient presents after a death of a friend and a family member with suicidal thoughts, her plan is to not eat or drink.  For medical clearance I have ordered labs and a CT scan of her abdomen, she is complaining of abdominal pain.    Labs again demonstrate anemia, otherwise unremarkable.  Patient at this point is medically clear.  Her CT scan is negative for acute disease.  I spoke with the alert team who will assess the patient.    The patient is placed into ED observation on May 21, 2022 at 2:30 PM, she is awaiting evaluation by life skills.    The patient is signed out to oncoming ERP Dr. Jarod Tracey at 7 PM.      FINAL IMPRESSION  1.  Suicidal ideation  2.  Refusing to eat or drink  3.         Electronically signed by: Corona Schneider M.D., 5/21/2022 12:31 PM

## 2022-05-22 PROBLEM — F50.01 ANOREXIA NERVOSA, RESTRICTING TYPE: Status: ACTIVE | Noted: 2022-05-22

## 2022-05-22 PROBLEM — F33.1 MODERATE EPISODE OF RECURRENT MAJOR DEPRESSIVE DISORDER (HCC): Status: ACTIVE | Noted: 2022-05-22

## 2022-05-22 PROCEDURE — 90837 PSYTX W PT 60 MINUTES: CPT | Performed by: SOCIAL WORKER

## 2022-05-22 PROCEDURE — 90791 PSYCH DIAGNOSTIC EVALUATION: CPT

## 2022-05-22 PROCEDURE — 700101 HCHG RX REV CODE 250: Performed by: EMERGENCY MEDICINE

## 2022-05-22 RX ORDER — MOXIFLOXACIN 5 MG/ML
1 SOLUTION/ DROPS OPHTHALMIC 3 TIMES DAILY
Status: COMPLETED | OUTPATIENT
Start: 2022-05-22 | End: 2022-05-23

## 2022-05-22 RX ADMIN — MOXIFLOXACIN HYDROCHLORIDE 1 DROP: 5 SOLUTION/ DROPS OPHTHALMIC at 13:41

## 2022-05-22 RX ADMIN — MOXIFLOXACIN HYDROCHLORIDE 1 DROP: 5 SOLUTION/ DROPS OPHTHALMIC at 18:30

## 2022-05-22 NOTE — ED NOTES
Patient moved to [facility] 33 and placed on 1:1 continuous sitter.  Room stripped of all potential harm items and lights dimmer for patient comfort.   Patient remains calm and cooperative with care, tearful and intermittently crying, NAD and respirations even and unlabored.  All belongings sent home with patient mother.  Report to YESSICA Jack

## 2022-05-22 NOTE — ED NOTES
Pt resting in [facility].  No needs or complaints voiced at this time.   Sitter at bedside.

## 2022-05-22 NOTE — ED NOTES
Mother at bedside with patient.  Patient allowed to make supervised phone call to boyfriend at this time.  Patient remains AAO4, calm and cooperative with care, and in NAD.

## 2022-05-22 NOTE — ED NOTES
Pt repositioned self, eyes closed, equal chest rise noted, no signs of distress noted, sitter outside room in direct line of sight to the pt, will continue to monitor

## 2022-05-22 NOTE — ED NOTES
Pt laying in bed, eyes closed, equal chest rise noted, no signs of distress noted, sitter outside room in direct line of sight to the pt, mother took pts belongings home with her

## 2022-05-22 NOTE — ED NOTES
Patient's home medications have been reviewed by the pharmacy team.     Past Medical History:   Diagnosis Date   • Anemia    • Eczema        Patient's Medications   New Prescriptions    No medications on file   Previous Medications    ALPRAZOLAM (XANAX) 0.5 MG TAB    Take 0.5 mg by mouth 3 times a day as needed for Sleep or Anxiety.    ETONOGESTREL (NEXPLANON) 68 MG IMPLANT IMPLANT    Inject 1 Each under the skin.    FERROUS SULFATE 325 (65 FE) MG TABLET    Take 325 mg by mouth every day.    MOXIFLOXACIN (VIGAMOX) 0.5 % SOLUTION    Administer  into the left eye 3 times a day.   Modified Medications    No medications on file   Discontinued Medications    FERROUS GLUCONATE (IRON 27 PO)    Take 325 mg by mouth.          A:  Medications do not appear to be contributing to current complaints.       P:    No recommendations at this time. Home medications have been reordered as appropriate.      Linda Alaniz, PharmD, BCPS

## 2022-05-22 NOTE — DISCHARGE PLANNING
Alert Team:    E-mailed Patient Financial Assistance to request resources to help pay for medical bills per pt request.

## 2022-05-22 NOTE — ED NOTES
Pt resting on gurney, no distress noted, no current needs.   Per ERP, pt not being placed on legal hold at this time. Will medically clear patient prior to being seen by alert team.

## 2022-05-22 NOTE — DISCHARGE PLANNING
Alert Team/Behavioral Health   Note:     Talked to Mariah @ [facility]. No beds currently available. Patient is on waiting list.

## 2022-05-22 NOTE — PROGRESS NOTES
Mariah Muhammad is a 21 y.o. female new patient here for syncope, menorrhagia, disordered eating and depression   HPI:    Menorrhagia with irregular cycle  New to me, patient presents today complaining of heavy menstrual bleeding.  States that she has had a history of iron deficiency anemia in the past.  Has had 2 episodes of syncope in the last week.  Denies any chest pain or shortness of breath.  No palpitations.    Patient does have a Nexplanon implanted.  No concerns with this    Anorexia nervosa, restricting type  New to me; chronic  Reports that she actively restricts her eating because she is constantly worried about calories.   Does not have bingeing and purging episodes.     Has not seen any drastic weight changes.     Does report several episodes of syncope within the past few weeks, worsening over the past few days.   Denies chest pain or sob.   Does not feel like she is getting enough fluids.     Pt does have a history of depression/anxiety.   Has been hospitalized in the past. No active SI, though has had these in the past.  Seems to have a strained relationship with mother and father. Does not want to use any physiatric medication at this time.     Current medicines (including changes today)  Current Outpatient Medications   Medication Sig Dispense Refill   • etonogestrel (NEXPLANON) 68 MG Implant implant Inject 1 Each under the skin.     • ALPRAZolam (XANAX) 0.5 MG Tab Take 0.5 mg by mouth 3 times a day as needed for Sleep or Anxiety.     • moxifloxacin (VIGAMOX) 0.5 % Solution Administer  into the left eye 3 times a day.     • ferrous sulfate 325 (65 Fe) MG tablet Take 325 mg by mouth every day.       No current facility-administered medications for this visit.     She  has a past medical history of Anemia and Eczema.  She  has a past surgical history that includes tonsillectomy; tonsillectomy; and adenoidectomy.  Social History     Tobacco Use   • Smoking status: Never Smoker   • Smokeless  "tobacco: Never Used   Vaping Use   • Vaping Use: Never used   Substance Use Topics   • Alcohol use: Never   • Drug use: Never     Social History     Social History Narrative   • Not on file     History reviewed. No pertinent family history.  No family status information on file.         ROS  Constitutional: Negative for fever, chills and +fatigue   HENT: Negative for congestion.    Eyes: Negative for pain.   Respiratory: Negative for cough and shortness of breath.    Cardiovascular: Negative for leg swelling.   Gastrointestinal: +abdominal pain   Genitourinary: Negative for dysuria and hematuria.   Skin: Negative for rash.   Neurological: +syncope   Endo/Heme/Allergies: Does not bruise/bleed easily.   Psychiatric/Behavioral: +depression/anxiety/disordered eating.     All other systems reviewed and are negative     Objective:     /72 (BP Location: Left arm, Patient Position: Sitting, BP Cuff Size: Adult)   Pulse 94   Temp 36.1 °C (97 °F) (Temporal)   Resp 16   Ht 1.651 m (5' 5\")   Wt 64.5 kg (142 lb 3.2 oz)   SpO2 99%  Body mass index is 23.66 kg/m².  Physical Exam:    Constitutional: Alert, no distress. Seems to have a strained relationship with mother who is present today.   Skin: Warm, dry, good turgor, no rashes in visible areas.  Eye: Equal, round and reactive, conjunctiva clear, lids normal.  Neck: Trachea midline, no masses, no thyromegaly. No cervical or supraclavicular lymphadenopathy.  Respiratory: Unlabored respiratory effort, lungs clear to auscultation, no wheezes, no ronchi.  Cardiovascular: Normal S1, S2, no murmur, no edema.  Abdomen: Soft. Normal active BS x4. Diffuse tenderness throughout all quads. Guarding present in upper and lower abdomen.   Psych: Alert and oriented x3, normal affect and mood.      Assessment and Plan:   The following treatment plan was discussed    1. Syncope, unspecified syncope type  New to me; worsening over past few weeks.   EKG in clinic shows normal sinus and "  non specific T wave inversion in V3.   Given history of restrictive diet, I am concerned that she is not getting enough calories or fluids and causing syncopal episodes.   Strict ED precautions if she has a syncopal episode again.   Will also r/o severe iron def given history of menorrhagia.   - EKG - Clinic Performed    2. Encounter to establish care  New patient to me today. Some previous records are available for review.     3. Menorrhagia with regular cycle  Due to have Nexplanon removed.   Will order pelvic US; r/o fibroids.   - CBC WITH DIFFERENTIAL; Future  - FERRITIN; Future  - IRON/TOTAL IRON BIND; Future  - US-PELVIC COMPLETE (TRANSABDOMINAL/TRANSVAGINAL) (COMBO); Future    4. Fatigue, unspecified type  - TSH; Future  - FREE THYROXINE; Future  - T3 FREE; Future  - VITAMIN D,25 HYDROXY; Future    5. Anorexia nervosa, restricting type  Strongly recommend eval by psychiatry - though she declines at this time.   - Comp Metabolic Panel; Future    6. MDD  New to me, chronic  We had a lengthy discussion about treatment options and seeing a therapist/psychologist/psychiatrist.   She declined at this time and states that several medications that she has been on in the past made her feel worse and does not want to use any medication at this time.   STRICT ED precautions is SI/HI because active plan.     Records requested.  Followup: Return in about 2 weeks (around 5/31/2022), or if symptoms worsen or fail to improve.       Ella Todd P.A.-C.  Supervising MD: Dr. Alex Pike MD  05/22/22

## 2022-05-22 NOTE — ASSESSMENT & PLAN NOTE
New to me; chronic  Reports that she actively restricts her eating because she is constantly worried about calories.   Does not have bingeing and purging episodes.     Has not seen any drastic weight changes.     Does report several episodes of syncope within the past few weeks, worsening over the past few days.   Denies chest pain or sob.   Does not feel like she is getting enough fluids.     Pt does have a history of depression/anxiety.   Has been hospitalized in the past. No active SI, though has had these in the past.  Seems to have a strained relationship with mother and father. Does not want to use any physiatric medication at this time.

## 2022-05-22 NOTE — DISCHARGE PLANNING
Alert Team/Behavioral Health   Note:    Talked to Sal @ [facility]. No beds currently available. Patient is on waiting list.

## 2022-05-22 NOTE — ED NOTES
Pt laying repositioned self in bed, eyes closed, equal chest rise noted, no signs of distress noted, sitter outside room in direct line of sight to the pt, will continue to monitor

## 2022-05-22 NOTE — ED NOTES
Pt laying in bed, equal chest rise noted, no signs of distress noted at this time, sitter outside room in direct line of sight to the pt

## 2022-05-22 NOTE — ED NOTES
Pt resting in rm, easy, equal respirations. Pt remains calm, cooperative, under direct obs of designee.

## 2022-05-22 NOTE — ED NOTES
Pt resting in [facility].  No needs or complaints voiced at this time.   Sitter at bedside.

## 2022-05-22 NOTE — CONSULTS
[facility]   TRIAGE ASSESSMENT    Name: Mariah Muhammad  MRN: [id number]  : 2000  Age: 21 y.o.  Date of assessment: 2022  PCP: Ella Todd P.A.-C.  Persons in attendance: Patient and Biological Mother  Patient Location: [facility]    CHIEF COMPLAINT/PRESENTING ISSUE (as stated by patient): Pt is a 20 y/o female presenting to ED reporting multiple medical complaints including abdominal pain, dizziness, nausea, and constipation. Pt's mom reports the pt has been fainting/falling a lot due to lack of energy from not eating. At time of behavioral health consult pt states she has not been eating for a significant amount of time in an attempt to kill herself. Hx of self-harm Reports eating disorder-restrictive type. Recent deaths in family and told her mom she wants to go be with them. Worsening depression. Denies HI/hallucinations. Pt is not currently receiving any outpatient psychiatric serviecs. Reports hx of inpatient psychiatric hospitalization at age 17 x2 at [facility]. Denies ETOH/substance use; JEAN 0.00; UDS negative for all substances. Legal hold initiated. Findings discussed with ERP who agrees pt needs to transfer to an inpatient psychiatric facility for further evaluation and stabilization. Inpatient psychiatric referral faxed to [facility], and [facility]. Outpatient psychiatric referrals faxed to [facility] and [facility].   Chief Complaint   Patient presents with   • Abdominal Pain     Started yesterday    • Dizziness   • Nausea     Last night    • Constipation     No BM x 3 weeks    • Suicidal Ideation        CURRENT LIVING SITUATION/SOCIAL SUPPORT/FINANCIAL RESOURCES: Pt recently moved back into [facility]. Pt is a  at [employer]; pt's mother nurse on L&D. Strong family support system.     BEHAVIORAL HEALTH/SUBSTANCE USE TREATMENT HISTORY  Does patient/parent report a  history of prior behavioral health/substance use treatment for patient?   Pt is not currently receiving any outpatient psychiatric serviecs. Reports hx of inpatient psychiatric hospitalization at age 17 x2 at [facility].     SAFETY ASSESSMENT - SELF  Does patient acknowledge current or past symptoms of dangerousness to self or is previous history noted? yes  Does parent/significant other report patient has current or past symptoms of dangerousness to self? N\A  Does presenting problem suggest symptoms of dangerousness to self? Yes:     Past Current    Suicidal Thoughts: [x]  [x]    Suicidal Plans: [x]  [x]    Suicidal Intent: [x]  [x]    Suicide Attempts: [x]  [x]    Self-Injury [x]  []      For any boxes checked above, provide detail: Pt states she has not been eating for a significant amount of time in an attempt to kill herself;  Has been going on for significant amount of time. Hx of self-harm.     History of suicide by family member: no  History of suicide by friend/significant other: no  Recent change in frequency/specificity/intensity of suicidal thoughts or self-harm behavior? yes - increasing over the last few months   Current access to firearms, medications, or other identified means of suicide/self-harm? yes - pt has access to means of SI  If yes, willing to restrict access to means of suicide/self-harm? yes - placed on legal hold and belongings secured; awaiting transfer to inpatient psychiatric facility.   Protective factors present:  Actively engaged in treatment and Willing to address in treatment    SAFETY ASSESSMENT - OTHERS  Does patient acknowledge current or past symptoms of aggressive behavior or risk to others or is previous history noted? no  Does parent/significant other report patient has current or past symptoms of aggressive behavior or risk to others?  N\A  Does presenting problem suggest symptoms of dangerousness to others? No; Denies HI.    LEGAL HISTORY  Does patient acknowledge  history of arrest/assisted/retirement or is previous history noted? no    Crisis Safety Plan completed and copy given to patient? N\A    ABUSE/NEGLECT SCREENING  Does patient report feeling “unsafe” in his/her home, or afraid of anyone?  no  Does patient report any history of physical, sexual, or emotional abuse?  no  Does parent or significant other report any of the above? N\A  Is there evidence of neglect by self?  no  Is there evidence of neglect by a caregiver? N/A  Does the patient/parent report any history of CPS/APS/police involvement related to suspected abuse/neglect or domestic violence? no  Based on the information provided during the current assessment, is a mandated report of suspected abuse/neglect being made?  No    SUBSTANCE USE SCREENING       UDS results: negative  Breathalyzer results: 0.00          MENTAL STATUS   Participation: Active verbal participation, Attentive, Engaged and Open to feedback  Grooming: Casual  Orientation: Alert and Fully Oriented  Behavior: Agitated and Tense  Eye contact: Limited  Mood: Depressed, Anxious and Irritable  Affect: Sad, Anxious, Tearful and Angry  Thought process: Circumstantial  Thought content: Within normal limits  Speech: Rate within normal limits and Volume within normal limits  Perception: Within normal limits  Memory:  No gross evidence of memory deficits  Insight: Poor  Judgment:  Poor  Other:    Collateral information:   Source:  [x] Mother, Agatha, present in person   [] Significant other by telephone  [] [facility]   [x] [facility]  [x] [facility]  [x] Other: ERP     [] Unable to complete full assessment due to:  [] Acute intoxication  [] Patient declined to participate/engage  [] Patient verbally unresponsive  [] Significant cognitive deficits  [] Significant perceptual distortions or behavioral disorganization  [x] Other: N/A     CLINICAL IMPRESSIONS:  Primary:  Suicidal Ideation  Secondary:  Anorexia      IDENTIFIED  NEEDS/PLAN:  [Trigger DISPOSITION list for any items marked]    [x]  Imminent safety risk - self [] Imminent safety risk - others   []  Acute substance withdrawal []  Psychosis/Impaired reality testing   [x]  Mood/anxiety []  Substance use/Addictive behavior   [x]  Maladaptive behaviro []  Parent/child conflict   []  Family/Couples conflict [x]  Biomedical   []  Housing [x]  Financial   []   Legal  Occupational/Educational   []  Domestic violence []  Other:     Recommended Plan of Care:  Actively being addressed by Legal Hold and [facility] Emergency Department, Refer to inpatient psychiatric facilities and 1:1 Observation. Pt states she has not been eating for a significant amount of time in an attempt to kill herself. Reports eating disorder-restrictive type. Recent deaths in family and told her mom she wants to go be with them. Worsening depression. Denies HI/hallucinations. Legal hold initiated. Findings discussed with ERP who agrees pt needs to transfer to an inpatient psychiatric facility for further evaluation and stabilization. Inpatient psychiatric referral faxed to [facility], [facility], and [facility]. Outpatient psychiatric referrals faxed to [facility] and [facility].   *Telesitter may not be utilized for moderate or high risk patients    Has the Recommended Plan of Care/Level of Observation been reviewed with the patient's assigned nurse? Yes; high risk; 1:1 angelita; [place]    Does patient/parent or guardian express agreement with the above plan? Yes      Referral appointment(s) scheduled? N\A    Alert team only:   I have discussed findings and recommendations with Dr. Tracey who is in agreement with these recommendations.     Referral information sent to the following outpatient community providers : [facility] and [facility]    Referral information sent to the following inpatient community providers : [facility], [facility] and [facility] .    If applicable : Referred  to  Alert Team for legal hold follow up at (time): 5/21/22 @ 0824      Rachael Meyers R.N.  5/22/2022

## 2022-05-22 NOTE — ED NOTES
Pt laying in bed, eyes closed, equal chest rise noted, sitter outside room in direct line of sight to the pt, will continue to monitor

## 2022-05-22 NOTE — ED PROVIDER NOTES
ED Provider Note    Patient is actively suicidal.  The patient is medically clear.  At this time we will need to keep the patient and place her on a legal hold.  She will need to be transferred to a psychiatric facility.

## 2022-05-22 NOTE — ED NOTES
Pt resting on gurney, no distress noted, denies any current needs. Pt medically cleared and awaiting alert team evaluation.

## 2022-05-22 NOTE — PROGRESS NOTES
"ED Provider Progress Note    ED Observation Progress Note    Date of Service: 05/22/22    Interval History  No new complaints this morning.  Nursing staff stated patient ate a small amount of her breakfast.  She remains depressed, on psychiatric hold, awaiting reevaluation.  No new medical complaint.    Physical Exam  /73   Pulse 63   Temp 36.4 °C (97.5 °F) (Temporal)   Resp 15   Ht 1.651 m (5' 5\")   Wt 64.3 kg (141 lb 12.1 oz)   LMP 05/01/2022 (Approximate)   SpO2 97%   BMI 23.59 kg/m² .    Constitutional: Awake and alert. Nontoxic  HENT: No facial swelling  Eyes: No scleral icterus  Cardiovascular: Normal heart rate   Thorax & Lungs: No respiratory distress  Abdomen: No distention  Skin:  No cyanosis.   Psychiatric: Affect flat, depressed mood    Labs  Results for orders placed or performed during the hospital encounter of 05/21/22   URINE DRUG SCREEN   Result Value Ref Range    Amphetamines Urine Negative Negative    Barbiturates Negative Negative    Benzodiazepines Negative Negative    Cocaine Metabolite Negative Negative    Methadone Negative Negative    Opiates Negative Negative    Oxycodone Negative Negative    Phencyclidine -Pcp Negative Negative    Propoxyphene Negative Negative    Cannabinoid Metab Negative Negative   BETA-HCG QUALITATIVE URINE   Result Value Ref Range    Beta-Hcg Urine Negative Negative   COMP METABOLIC PANEL   Result Value Ref Range    Sodium 138 135 - 145 mmol/L    Potassium 3.8 3.6 - 5.5 mmol/L    Chloride 106 96 - 112 mmol/L    Co2 20 20 - 33 mmol/L    Anion Gap 12.0 7.0 - 16.0    Glucose 88 65 - 99 mg/dL    Bun 9 8 - 22 mg/dL    Creatinine 0.62 0.50 - 1.40 mg/dL    Calcium 9.1 8.5 - 10.5 mg/dL    AST(SGOT) 18 12 - 45 U/L    ALT(SGPT) 7 2 - 50 U/L    Alkaline Phosphatase 69 30 - 99 U/L    Total Bilirubin 0.3 0.1 - 1.5 mg/dL    Albumin 4.3 3.2 - 4.9 g/dL    Total Protein 6.9 6.0 - 8.2 g/dL    Globulin 2.6 1.9 - 3.5 g/dL    A-G Ratio 1.7 g/dL   LIPASE   Result Value Ref " Range    Lipase 43 11 - 82 U/L   URINALYSIS (UA)    Specimen: Urine   Result Value Ref Range    Color Yellow     Character Cloudy (A)     Specific Gravity >=1.030 <1.035    Ph 5.5 5.0 - 8.0    Glucose Negative Negative mg/dL    Ketones Negative Negative mg/dL    Protein Negative Negative mg/dL    Bilirubin Negative Negative    Urobilinogen, Urine 0.2 Negative    Nitrite Negative Negative    Leukocyte Esterase Trace (A) Negative    Occult Blood Small (A) Negative    Micro Urine Req Microscopic    URINE MICROSCOPIC (W/UA)   Result Value Ref Range    WBC 2-5 /hpf    RBC 0-2 /hpf    Bacteria Negative None /hpf    Epithelial Cells Few /hpf    Amorphous Crystal Present /hpf   CBC WITH DIFFERENTIAL   Result Value Ref Range    WBC 6.0 4.8 - 10.8 K/uL    RBC 3.56 (L) 4.20 - 5.40 M/uL    Hemoglobin 9.0 (L) 12.0 - 16.0 g/dL    Hematocrit 28.3 (L) 37.0 - 47.0 %    MCV 79.5 (L) 81.4 - 97.8 fL    MCH 25.3 (L) 27.0 - 33.0 pg    MCHC 31.8 (L) 33.6 - 35.0 g/dL    RDW 35.8 (L) 35.9 - 50.0 fL    Platelet Count 307 164 - 446 K/uL    MPV 10.8 9.0 - 12.9 fL    Neutrophils-Polys 50.90 44.00 - 72.00 %    Lymphocytes 36.40 22.00 - 41.00 %    Monocytes 8.10 0.00 - 13.40 %    Eosinophils 3.20 0.00 - 6.90 %    Basophils 1.20 0.00 - 1.80 %    Immature Granulocytes 0.20 0.00 - 0.90 %    Nucleated RBC 0.00 /100 WBC    Neutrophils (Absolute) 3.04 2.00 - 7.15 K/uL    Lymphs (Absolute) 2.17 1.00 - 4.80 K/uL    Monos (Absolute) 0.48 0.00 - 0.85 K/uL    Eos (Absolute) 0.19 0.00 - 0.51 K/uL    Baso (Absolute) 0.07 0.00 - 0.12 K/uL    Immature Granulocytes (abs) 0.01 0.00 - 0.11 K/uL    NRBC (Absolute) 0.00 K/uL   ESTIMATED GFR   Result Value Ref Range    GFR (CKD-EPI) 131 >60 mL/min/1.73 m 2       Radiology  CT-RENAL COLIC EVALUATION(A/P W/O)   Final Result         1. No urinary tract calculus identified. No renal collecting system dilatation.      2. No evidence of inflammatory change in the abdomen or pelvis.  The study is however limited due to  nonuse of intravenous contrast          Problem List  1.  Suicidal ideation: Patient has now eaten a small amount of food this morning for breakfast according nursing staff.  She remains on psychiatric hold, pending reevaluation by psychiatry.    2.  Dizziness: Patient medically cleared yesterday      Electronically signed by: Nito Vasques M.D., 5/22/2022 11:42 AM

## 2022-05-22 NOTE — DISCHARGE PLANNING
Alert Team/Behavioral Health   Note:    Sal from [facility] called. Patient is pending due to needed monitoring for bowel movement and stabilization of labs.

## 2022-05-22 NOTE — ED NOTES
Report from YESSICA Rose  Patient resting comfortably on rney with door open and lights dimmed.  Pending Alert Team evaluation.

## 2022-05-22 NOTE — DISCHARGE PLANNING
Alert Team/Behavioral Health   Note:     Sent referral to partial hospitalization program for eating disorder treatment per Erin Kinsey (Alert Team Behavioral Health Therapist).      [facility] ALERT TEAM DISCHARGE PLANNING NOTE    Date:  5/22/22  Patient Name:  Mariah Muhammad - 21 y.o. - Discharge Planning  MRN:  [id number]   YOB: 2000  ADMISSION DATE:  5/21/2022      Writer forwarded transfer packet for inpatient psychiatric care to the following community providers:  [facility]   Items  included in the transfer packet:   _x____Face Sheet   _x____Pages 1 and 2 of completed legal hold   _x____Alert Team/Psych Assessment   _____H&P   _x____UDS   _x____Blood Alcohol   _x____Vital signs   _x____Pregnancy Test (if applicable)   _x____Medications List   _____Covid Screen
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
## 2022-05-22 NOTE — DISCHARGE PLANNING
Alert Team/Behavioral Health   Note:     Talked to Lisseth @ [facility]. No beds currently available. Patient is on waiting list.

## 2022-05-23 PROCEDURE — 700102 HCHG RX REV CODE 250 W/ 637 OVERRIDE(OP): Performed by: EMERGENCY MEDICINE

## 2022-05-23 PROCEDURE — A9270 NON-COVERED ITEM OR SERVICE: HCPCS | Performed by: EMERGENCY MEDICINE

## 2022-05-23 RX ORDER — ALPRAZOLAM 0.25 MG/1
0.5 TABLET ORAL NIGHTLY PRN
Status: DISCONTINUED | OUTPATIENT
Start: 2022-05-23 | End: 2022-05-24 | Stop reason: HOSPADM

## 2022-05-23 RX ADMIN — MOXIFLOXACIN HYDROCHLORIDE 1 DROP: 5 SOLUTION/ DROPS OPHTHALMIC at 06:00

## 2022-05-23 RX ADMIN — ALPRAZOLAM 0.5 MG: 0.25 TABLET ORAL at 22:00

## 2022-05-23 RX ADMIN — MAGNESIUM HYDROXIDE 30 ML: 400 SUSPENSION ORAL at 16:14

## 2022-05-23 NOTE — ED NOTES
.Pt resting in rm, easy, equal respirations. Pt remains calm, cooperative, under direct obs of designee.

## 2022-05-23 NOTE — ED NOTES
Sitter at bedside for 1:1 observation.    Pt to restroom with staff escort.  Pt reports menstrual bleeding with brown discharge. Pt denies BM.  Pt states that she has had menstrual bleeding which started on the 1st of May and stopped yesterday.  Informed MD.    Rounded on Pt.    Updated Pt on plan of care. Pt verbalized understanding.  No acute distress at this time.  Will continue to monitor.

## 2022-05-23 NOTE — ED PROVIDER NOTES
"ED Provider Note  ED Observation Progress Note    Date of Service: 05/23/22    Interval History  This patient presented to the ED on 5/21/2022 due to SI and depression.  Patient stated she was not eating or drinking for some time.    Patient was medically cleared.  Patient was seen by psychiatry yesterday and her legal hold was extended.  They agreed with inpatient psychiatric facility.    Physical Exam  /64   Pulse 68   Temp 36.4 °C (97.5 °F) (Temporal)   Resp 16   Ht 1.651 m (5' 5\")   Wt 64.3 kg (141 lb 12.1 oz)   LMP 05/01/2022 (Approximate)   SpO2 96%   BMI 23.59 kg/m² .    Constitutional: Awake and alert. Nontoxic  HENT:  Grossly normal  Eyes: Grossly normal  Neck: Normal range of motion  Cardiovascular: Normal heart rate   Thorax & Lungs: No respiratory distress  Abdomen: Nontender  Skin:  No pathologic rash.   Extremities: Well perfused  Psychiatric: Affect normal    Labs  Results for orders placed or performed during the hospital encounter of 05/21/22   URINE DRUG SCREEN   Result Value Ref Range    Amphetamines Urine Negative Negative    Barbiturates Negative Negative    Benzodiazepines Negative Negative    Cocaine Metabolite Negative Negative    Methadone Negative Negative    Opiates Negative Negative    Oxycodone Negative Negative    Phencyclidine -Pcp Negative Negative    Propoxyphene Negative Negative    Cannabinoid Metab Negative Negative   BETA-HCG QUALITATIVE URINE   Result Value Ref Range    Beta-Hcg Urine Negative Negative   COMP METABOLIC PANEL   Result Value Ref Range    Sodium 138 135 - 145 mmol/L    Potassium 3.8 3.6 - 5.5 mmol/L    Chloride 106 96 - 112 mmol/L    Co2 20 20 - 33 mmol/L    Anion Gap 12.0 7.0 - 16.0    Glucose 88 65 - 99 mg/dL    Bun 9 8 - 22 mg/dL    Creatinine 0.62 0.50 - 1.40 mg/dL    Calcium 9.1 8.5 - 10.5 mg/dL    AST(SGOT) 18 12 - 45 U/L    ALT(SGPT) 7 2 - 50 U/L    Alkaline Phosphatase 69 30 - 99 U/L    Total Bilirubin 0.3 0.1 - 1.5 mg/dL    Albumin 4.3 3.2 - " 4.9 g/dL    Total Protein 6.9 6.0 - 8.2 g/dL    Globulin 2.6 1.9 - 3.5 g/dL    A-G Ratio 1.7 g/dL   LIPASE   Result Value Ref Range    Lipase 43 11 - 82 U/L   URINALYSIS (UA)    Specimen: Urine   Result Value Ref Range    Color Yellow     Character Cloudy (A)     Specific Gravity >=1.030 <1.035    Ph 5.5 5.0 - 8.0    Glucose Negative Negative mg/dL    Ketones Negative Negative mg/dL    Protein Negative Negative mg/dL    Bilirubin Negative Negative    Urobilinogen, Urine 0.2 Negative    Nitrite Negative Negative    Leukocyte Esterase Trace (A) Negative    Occult Blood Small (A) Negative    Micro Urine Req Microscopic    URINE MICROSCOPIC (W/UA)   Result Value Ref Range    WBC 2-5 /hpf    RBC 0-2 /hpf    Bacteria Negative None /hpf    Epithelial Cells Few /hpf    Amorphous Crystal Present /hpf   CBC WITH DIFFERENTIAL   Result Value Ref Range    WBC 6.0 4.8 - 10.8 K/uL    RBC 3.56 (L) 4.20 - 5.40 M/uL    Hemoglobin 9.0 (L) 12.0 - 16.0 g/dL    Hematocrit 28.3 (L) 37.0 - 47.0 %    MCV 79.5 (L) 81.4 - 97.8 fL    MCH 25.3 (L) 27.0 - 33.0 pg    MCHC 31.8 (L) 33.6 - 35.0 g/dL    RDW 35.8 (L) 35.9 - 50.0 fL    Platelet Count 307 164 - 446 K/uL    MPV 10.8 9.0 - 12.9 fL    Neutrophils-Polys 50.90 44.00 - 72.00 %    Lymphocytes 36.40 22.00 - 41.00 %    Monocytes 8.10 0.00 - 13.40 %    Eosinophils 3.20 0.00 - 6.90 %    Basophils 1.20 0.00 - 1.80 %    Immature Granulocytes 0.20 0.00 - 0.90 %    Nucleated RBC 0.00 /100 WBC    Neutrophils (Absolute) 3.04 2.00 - 7.15 K/uL    Lymphs (Absolute) 2.17 1.00 - 4.80 K/uL    Monos (Absolute) 0.48 0.00 - 0.85 K/uL    Eos (Absolute) 0.19 0.00 - 0.51 K/uL    Baso (Absolute) 0.07 0.00 - 0.12 K/uL    Immature Granulocytes (abs) 0.01 0.00 - 0.11 K/uL    NRBC (Absolute) 0.00 K/uL   ESTIMATED GFR   Result Value Ref Range    GFR (CKD-EPI) 131 >60 mL/min/1.73 m 2       Radiology  CT-RENAL COLIC EVALUATION(A/P W/O)   Final Result         1. No urinary tract calculus identified. No renal collecting  system dilatation.      2. No evidence of inflammatory change in the abdomen or pelvis.  The study is however limited due to nonuse of intravenous contrast          Problem List  1. Suicidal ideation       Electronically signed by: Soledad Weinstein M.D., 5/23/2022 11:48 AM

## 2022-05-23 NOTE — ED NOTES
Sitter at bedside for 1:1 observation.    Rounded on Pt.    Mother at bedside.    Updated Pt on plan of care. Pt verbalized understanding.  No acute distress at this time.  Will continue to monitor.

## 2022-05-23 NOTE — CONSULTS
"  [facility] PSYCHIATRIC FOLLOW-UP:(established)    DOS: 05/23/22     Reason for admission:  Suicide attempt - overdose  Legal Hold Status: on legal hold      ID/Chief Complaint: Patient is a 21 y.o., single  female, with psych hx of depression, and medical hx s/f chronic anemia.   Chief Complaint   Patient presents with   • Abdominal Pain     Started yesterday    • Dizziness   • Nausea     Last night    • Constipation     No BM x 3 weeks    • Suicidal Ideation                    S:  Mood is \"sad\" refuses to answer re: SI, \"last time I answered I was put on a legal hold and now I am stuck here.\" Refusing to eat (per previous notes had indicated SI with plan to restrict food intake). Denies HI. Denies auditory/visual hallucination. Does not want to take medications, as she worries that she won't be able to get off of them. Does not like that antidepressants have to be weaned off and says that nothing has ever been helpful in the past besides Xanax.     O: Medical ROS (as pertinent): No new changes reported to this writer.    Patient Active Problem List   Diagnosis   • Menorrhagia with irregular cycle   • Anorexia nervosa, restricting type   • Moderate episode of recurrent major depressive disorder (HCC)         PSYCHIATRIC EXAM (MSE):   Vitals:    05/22/22 1800   BP: 118/65   Pulse: 64   Resp: 14   Temp:    SpO2: 97%      Weight: 64.3kg 5/21/22    Constitutional: as noted above  General Appearance/Behavior: 21 y.o. appears stated age, normal habitus intermittent eye contact indifferent superficially cooperative, No behavioral disturbances  Abnormal Movements: none, no PMA/PMR or tremor observed.  Gait and Posture: within normal limits - ambulated independently to the bathroom  Musculoskeletal: as noted above  Mood: \"sad\"  Affect: Constricted - minimal reactivity to conversation, guarded  Speech: normal rate, normal rhythm, normal tone, normal volume and normal fluency  Language:  " spontaneous, comprehends spoken commands and fluent   Thought Process: Linear Logical Goal Directed  (wants to be discharged)  Thought Content: Refuses to answer re: SI, Denies HI. Denies A/VH, no e/o delusions, PI, or internal preoccupation.   Insight/Judgement:  limited  Alert/Orientation: alert, oriented to person, place and time  Attn/Concentration: normal  Fund of Knowledge: Average  Memory recent/remote: No gross evidence of memory deficits  MMSE: deferred this visit          Meds Current:  Scheduled Medications   Medication Dose Frequency   • moxifloxacin  1 Drop TID   • magnesium hydroxide  30 mL Once     Allergies: No Known Allergies        *ASSESSMENT:   1. Suicidal ideation       1. Major Depressive d/o, recurrent  2. PTSD    Medical: as per medical team     Patient is guarded and reluctant to engage in conversation. Continues to meet criteria for a hold and is still at elevated risk of harm to self. Patient would benefit from psychiatric hospitalization for stabilization and treatment.       *RECOMMENDATIONS:  Legal Status: on legal hold - will continue    Please transfer patient to inpatient psychiatric hospital when medically cleared and bed is available.    Observation status:   -line of site with sitter    Visitors: Yes  Personal belongings: Yes - please allow pt a phone    Discussed/voalted: Dr. Weinstein, RN Sravani    Medication Recommendations: Final orders as per Treatment Team  1. Patient does not want to take medications, will continue to offer an antidepressant and provide education  2. Medication reconciliation was completed.  3. Reviewed safety plan: 911, ER, PCM, MHC, suicide crisis line, nursing staff while inpatient.    Will Continue to Follow. Thank you for the consult.

## 2022-05-23 NOTE — ED NOTES
Sitter at bedside for 1:1 observation.    Received report; assumed care of Pt.    Introduced self to Pt; informed her that I am part of her care team.  Rounded on Pt. Updated Pt on plan of care. Pt verbalized understanding.  No acute distress at this time.  Will continue to monitor.

## 2022-05-23 NOTE — ED NOTES
Pt and family updated on plan of care. Per latest psychiatry noted, she can have visitors and her phone. Mom to visit later this evening.

## 2022-05-23 NOTE — DISCHARGE PLANNING
Alert Team Note:    Contacted by [facility], spoke to Mikki. Pt has been declined due to insurance being out of network.

## 2022-05-23 NOTE — DISCHARGE PLANNING
Alert Team Note:      Contacted [facility], spoke to Rex. Pt is on the wait list. No beds available at this time.      Contacted [facility], spoke to Mikki. Pt packet could not be located. Writer sent a new packet.

## 2022-05-23 NOTE — ED NOTES
Assumed care of pt, report received. Pt resting in [facility]. No distress noted. Will cont to monitor.

## 2022-05-23 NOTE — ED NOTES
Sitter at bedside for 1:1 observation.    Rounded on Pt.    Updated Pt on plan of care. Pt verbalized understanding.  No acute distress at this time.  Will continue to monitor.

## 2022-05-24 ENCOUNTER — APPOINTMENT (OUTPATIENT)
Dept: MEDICAL GROUP | Facility: LAB | Age: 22
End: 2022-05-24
Payer: COMMERCIAL

## 2022-05-24 VITALS
HEART RATE: 74 BPM | WEIGHT: 141.76 LBS | RESPIRATION RATE: 18 BRPM | HEIGHT: 65 IN | BODY MASS INDEX: 23.62 KG/M2 | OXYGEN SATURATION: 99 % | SYSTOLIC BLOOD PRESSURE: 122 MMHG | DIASTOLIC BLOOD PRESSURE: 71 MMHG | TEMPERATURE: 97.4 F

## 2022-05-24 LAB
ALBUMIN SERPL BCP-MCNC: 3.9 G/DL (ref 3.2–4.9)
ALBUMIN/GLOB SERPL: 1.6 G/DL
ALP SERPL-CCNC: 61 U/L (ref 30–99)
ALT SERPL-CCNC: 7 U/L (ref 2–50)
ANION GAP SERPL CALC-SCNC: 12 MMOL/L (ref 7–16)
AST SERPL-CCNC: 10 U/L (ref 12–45)
BASOPHILS # BLD AUTO: 1.1 % (ref 0–1.8)
BASOPHILS # BLD: 0.07 K/UL (ref 0–0.12)
BILIRUB SERPL-MCNC: 0.3 MG/DL (ref 0.1–1.5)
BUN SERPL-MCNC: 11 MG/DL (ref 8–22)
CALCIUM SERPL-MCNC: 8.5 MG/DL (ref 8.5–10.5)
CHLORIDE SERPL-SCNC: 105 MMOL/L (ref 96–112)
CO2 SERPL-SCNC: 22 MMOL/L (ref 20–33)
CREAT SERPL-MCNC: 0.63 MG/DL (ref 0.5–1.4)
EOSINOPHIL # BLD AUTO: 0.27 K/UL (ref 0–0.51)
EOSINOPHIL NFR BLD: 4.1 % (ref 0–6.9)
ERYTHROCYTE [DISTWIDTH] IN BLOOD BY AUTOMATED COUNT: 35.8 FL (ref 35.9–50)
GFR SERPLBLD CREATININE-BSD FMLA CKD-EPI: 130 ML/MIN/1.73 M 2
GLOBULIN SER CALC-MCNC: 2.5 G/DL (ref 1.9–3.5)
GLUCOSE SERPL-MCNC: 98 MG/DL (ref 65–99)
HCT VFR BLD AUTO: 30 % (ref 37–47)
HGB BLD-MCNC: 9.6 G/DL (ref 12–16)
IMM GRANULOCYTES # BLD AUTO: 0.02 K/UL (ref 0–0.11)
IMM GRANULOCYTES NFR BLD AUTO: 0.3 % (ref 0–0.9)
LYMPHOCYTES # BLD AUTO: 2.51 K/UL (ref 1–4.8)
LYMPHOCYTES NFR BLD: 38.1 % (ref 22–41)
MCH RBC QN AUTO: 25.4 PG (ref 27–33)
MCHC RBC AUTO-ENTMCNC: 32 G/DL (ref 33.6–35)
MCV RBC AUTO: 79.4 FL (ref 81.4–97.8)
MONOCYTES # BLD AUTO: 0.55 K/UL (ref 0–0.85)
MONOCYTES NFR BLD AUTO: 8.3 % (ref 0–13.4)
NEUTROPHILS # BLD AUTO: 3.17 K/UL (ref 2–7.15)
NEUTROPHILS NFR BLD: 48.1 % (ref 44–72)
NRBC # BLD AUTO: 0 K/UL
NRBC BLD-RTO: 0 /100 WBC
PLATELET # BLD AUTO: 369 K/UL (ref 164–446)
PMV BLD AUTO: 11 FL (ref 9–12.9)
POTASSIUM SERPL-SCNC: 3.6 MMOL/L (ref 3.6–5.5)
PROT SERPL-MCNC: 6.4 G/DL (ref 6–8.2)
RBC # BLD AUTO: 3.78 M/UL (ref 4.2–5.4)
SODIUM SERPL-SCNC: 139 MMOL/L (ref 135–145)
WBC # BLD AUTO: 6.6 K/UL (ref 4.8–10.8)

## 2022-05-24 PROCEDURE — 80053 COMPREHEN METABOLIC PANEL: CPT

## 2022-05-24 PROCEDURE — 85025 COMPLETE CBC W/AUTO DIFF WBC: CPT

## 2022-05-24 PROCEDURE — 36415 COLL VENOUS BLD VENIPUNCTURE: CPT

## 2022-05-24 NOTE — DISCHARGE PLANNING
Medical Social Work    Referral: Legal hold Transfer to Mental Health Facility    Intervention: LAKEISHA received call from Ernestina at [facility] stating that Dr. Madison has accepted the patient for admission.  Due to other admissions Ernestina requested transport be arranged for 0500.    LAKEISHA arranged for transportation to be set up through [facility].    The pt will be picked up at 0500.     LAKEISHA notified the RN of the departure time as well as accepting facility.     Alert Team created transfer packet and placed on chart. Original Legal Hold placed in packet.     Plan: Pt will transfer to [facility] at 0500.

## 2022-05-24 NOTE — ED NOTES
Patient sleeping on gurney.   No acute distress.   Active chest rise noted.   1:1 sitter in direct view of patient.

## 2022-05-24 NOTE — ED NOTES
Resting in bed, no distress noted. Requested change to dinner tray. Called and updated at this time.

## 2022-05-24 NOTE — ED NOTES
Pt left ED ambulatory with patient's phone to [facility] accompanied by [facility] in stable condition

## 2022-05-24 NOTE — ED PROVIDER NOTES
ED Provider Note    I was asked by nursing staff to repeat the labs.  [facility] was requesting repeat labs as ordered.  Partner will follow up on these.

## 2022-05-24 NOTE — ED NOTES
Pt ambulatory to and from the restroom, reports small hard BM.   Also reports she is still having a small amount of vaginal bleeding from period.   Provided new underwear and a pad.

## 2022-05-24 NOTE — ED NOTES
Report received from Juan C JUAN  Pt eating dinner tray at this time  1:1 Sitter in direct view of patient

## 2022-06-13 ENCOUNTER — HOSPITAL ENCOUNTER (OUTPATIENT)
Dept: RADIOLOGY | Facility: MEDICAL CENTER | Age: 22
End: 2022-06-13
Attending: PHYSICIAN ASSISTANT
Payer: COMMERCIAL

## 2022-06-13 DIAGNOSIS — N92.0 MENORRHAGIA WITH REGULAR CYCLE: ICD-10-CM

## 2022-06-13 PROCEDURE — 76830 TRANSVAGINAL US NON-OB: CPT

## 2022-06-24 ENCOUNTER — TELEPHONE (OUTPATIENT)
Dept: ONCOLOGY | Facility: MEDICAL CENTER | Age: 22
End: 2022-06-24
Payer: COMMERCIAL

## 2022-06-24 NOTE — TELEPHONE ENCOUNTER
Called pt and let her know that we would need to cx her appt on 06/27/22 . I let her know that there is a national shortage of the medication that she was scheduled for and we are out of stock of this medication. I let her know that we can either rs her once we receive stock of this or we can call her provider an obtain an order for another type of this medication so that we can get her in and get her treated asap. I asked that she call us and let us know what she would prefer to do.

## 2022-06-27 ENCOUNTER — APPOINTMENT (OUTPATIENT)
Dept: ONCOLOGY | Facility: MEDICAL CENTER | Age: 22
End: 2022-06-27
Attending: PHYSICIAN ASSISTANT
Payer: COMMERCIAL

## 2022-06-28 ENCOUNTER — APPOINTMENT (OUTPATIENT)
Dept: LAB | Facility: MEDICAL CENTER | Age: 22
End: 2022-06-28
Payer: COMMERCIAL

## 2022-06-28 ENCOUNTER — HOSPITAL ENCOUNTER (OUTPATIENT)
Dept: LAB | Facility: MEDICAL CENTER | Age: 22
End: 2022-06-28
Attending: SPECIALIST
Payer: COMMERCIAL

## 2022-06-28 PROCEDURE — 36415 COLL VENOUS BLD VENIPUNCTURE: CPT

## 2022-06-28 PROCEDURE — 85246 CLOT FACTOR VIII VW ANTIGEN: CPT

## 2022-06-28 PROCEDURE — 85240 CLOT FACTOR VIII AHG 1 STAGE: CPT

## 2022-06-28 PROCEDURE — 85245 CLOT FACTOR VIII VW RISTOCTN: CPT

## 2022-06-30 DIAGNOSIS — D50.0 IRON DEFICIENCY ANEMIA DUE TO CHRONIC BLOOD LOSS: ICD-10-CM

## 2022-06-30 RX ORDER — HEPARIN SODIUM (PORCINE) LOCK FLUSH IV SOLN 100 UNIT/ML 100 UNIT/ML
500 SOLUTION INTRAVENOUS PRN
Status: CANCELLED | OUTPATIENT
Start: 2022-07-01

## 2022-06-30 RX ORDER — EPINEPHRINE 1 MG/ML(1)
0.5 AMPUL (ML) INJECTION PRN
Status: CANCELLED | OUTPATIENT
Start: 2022-07-01

## 2022-06-30 RX ORDER — SODIUM CHLORIDE 9 MG/ML
INJECTION, SOLUTION INTRAVENOUS CONTINUOUS
Status: CANCELLED | OUTPATIENT
Start: 2022-07-01

## 2022-06-30 RX ORDER — METHYLPREDNISOLONE SODIUM SUCCINATE 125 MG/2ML
125 INJECTION, POWDER, LYOPHILIZED, FOR SOLUTION INTRAMUSCULAR; INTRAVENOUS PRN
Status: CANCELLED | OUTPATIENT
Start: 2022-07-01

## 2022-06-30 RX ORDER — 0.9 % SODIUM CHLORIDE 0.9 %
3 VIAL (ML) INJECTION PRN
Status: CANCELLED | OUTPATIENT
Start: 2022-07-01

## 2022-06-30 RX ORDER — 0.9 % SODIUM CHLORIDE 0.9 %
VIAL (ML) INJECTION PRN
Status: CANCELLED | OUTPATIENT
Start: 2022-07-01

## 2022-06-30 RX ORDER — 0.9 % SODIUM CHLORIDE 0.9 %
10 VIAL (ML) INJECTION PRN
Status: CANCELLED | OUTPATIENT
Start: 2022-07-01

## 2022-06-30 RX ORDER — DIPHENHYDRAMINE HYDROCHLORIDE 50 MG/ML
50 INJECTION INTRAMUSCULAR; INTRAVENOUS PRN
Status: CANCELLED | OUTPATIENT
Start: 2022-07-01

## 2022-07-02 ENCOUNTER — APPOINTMENT (OUTPATIENT)
Dept: RADIOLOGY | Facility: MEDICAL CENTER | Age: 22
End: 2022-07-02
Attending: EMERGENCY MEDICINE
Payer: COMMERCIAL

## 2022-07-02 ENCOUNTER — HOSPITAL ENCOUNTER (EMERGENCY)
Facility: MEDICAL CENTER | Age: 22
End: 2022-07-02
Attending: EMERGENCY MEDICINE
Payer: COMMERCIAL

## 2022-07-02 VITALS
SYSTOLIC BLOOD PRESSURE: 148 MMHG | HEIGHT: 65 IN | HEART RATE: 81 BPM | TEMPERATURE: 98.6 F | BODY MASS INDEX: 25.53 KG/M2 | OXYGEN SATURATION: 98 % | RESPIRATION RATE: 16 BRPM | DIASTOLIC BLOOD PRESSURE: 88 MMHG | WEIGHT: 153.22 LBS

## 2022-07-02 DIAGNOSIS — N39.0 URINARY TRACT INFECTION WITHOUT HEMATURIA, SITE UNSPECIFIED: ICD-10-CM

## 2022-07-02 DIAGNOSIS — R19.7 BLOODY DIARRHEA: ICD-10-CM

## 2022-07-02 LAB
ALBUMIN SERPL BCP-MCNC: 5 G/DL (ref 3.2–4.9)
ALBUMIN/GLOB SERPL: 2.2 G/DL
ALP SERPL-CCNC: 66 U/L (ref 30–99)
ALT SERPL-CCNC: 10 U/L (ref 2–50)
ANION GAP SERPL CALC-SCNC: 13 MMOL/L (ref 7–16)
APPEARANCE UR: CLEAR
AST SERPL-CCNC: 11 U/L (ref 12–45)
BACTERIA #/AREA URNS HPF: ABNORMAL /HPF
BASOPHILS # BLD AUTO: 0.6 % (ref 0–1.8)
BASOPHILS # BLD: 0.05 K/UL (ref 0–0.12)
BILIRUB SERPL-MCNC: 0.5 MG/DL (ref 0.1–1.5)
BILIRUB UR QL STRIP.AUTO: NEGATIVE
BUN SERPL-MCNC: 10 MG/DL (ref 8–22)
CALCIUM SERPL-MCNC: 9.3 MG/DL (ref 8.5–10.5)
CHLORIDE SERPL-SCNC: 105 MMOL/L (ref 96–112)
CO2 SERPL-SCNC: 22 MMOL/L (ref 20–33)
COLOR UR: YELLOW
CREAT SERPL-MCNC: 0.68 MG/DL (ref 0.5–1.4)
EOSINOPHIL # BLD AUTO: 0.15 K/UL (ref 0–0.51)
EOSINOPHIL NFR BLD: 1.9 % (ref 0–6.9)
EPI CELLS #/AREA URNS HPF: ABNORMAL /HPF
ERYTHROCYTE [DISTWIDTH] IN BLOOD BY AUTOMATED COUNT: 39.8 FL (ref 35.9–50)
GFR SERPLBLD CREATININE-BSD FMLA CKD-EPI: 128 ML/MIN/1.73 M 2
GLOBULIN SER CALC-MCNC: 2.3 G/DL (ref 1.9–3.5)
GLUCOSE SERPL-MCNC: 89 MG/DL (ref 65–99)
GLUCOSE UR STRIP.AUTO-MCNC: NEGATIVE MG/DL
HCG SERPL QL: NEGATIVE
HCT VFR BLD AUTO: 32 % (ref 37–47)
HGB BLD-MCNC: 9.7 G/DL (ref 12–16)
HYALINE CASTS #/AREA URNS LPF: ABNORMAL /LPF
IMM GRANULOCYTES # BLD AUTO: 0.03 K/UL (ref 0–0.11)
IMM GRANULOCYTES NFR BLD AUTO: 0.4 % (ref 0–0.9)
KETONES UR STRIP.AUTO-MCNC: NEGATIVE MG/DL
LEUKOCYTE ESTERASE UR QL STRIP.AUTO: ABNORMAL
LIPASE SERPL-CCNC: 41 U/L (ref 11–82)
LYMPHOCYTES # BLD AUTO: 2.88 K/UL (ref 1–4.8)
LYMPHOCYTES NFR BLD: 37.4 % (ref 22–41)
MCH RBC QN AUTO: 22.8 PG (ref 27–33)
MCHC RBC AUTO-ENTMCNC: 30.3 G/DL (ref 33.6–35)
MCV RBC AUTO: 75.1 FL (ref 81.4–97.8)
MICRO URNS: ABNORMAL
MONOCYTES # BLD AUTO: 0.59 K/UL (ref 0–0.85)
MONOCYTES NFR BLD AUTO: 7.7 % (ref 0–13.4)
NEUTROPHILS # BLD AUTO: 4 K/UL (ref 2–7.15)
NEUTROPHILS NFR BLD: 52 % (ref 44–72)
NITRITE UR QL STRIP.AUTO: NEGATIVE
NRBC # BLD AUTO: 0 K/UL
NRBC BLD-RTO: 0 /100 WBC
PH UR STRIP.AUTO: 7 [PH] (ref 5–8)
PLATELET # BLD AUTO: 375 K/UL (ref 164–446)
PMV BLD AUTO: 11.1 FL (ref 9–12.9)
POTASSIUM SERPL-SCNC: 3.7 MMOL/L (ref 3.6–5.5)
PROT SERPL-MCNC: 7.3 G/DL (ref 6–8.2)
PROT UR QL STRIP: NEGATIVE MG/DL
RBC # BLD AUTO: 4.26 M/UL (ref 4.2–5.4)
RBC # URNS HPF: ABNORMAL /HPF
RBC UR QL AUTO: NEGATIVE
SODIUM SERPL-SCNC: 140 MMOL/L (ref 135–145)
SP GR UR STRIP.AUTO: 1.01
UROBILINOGEN UR STRIP.AUTO-MCNC: 0.2 MG/DL
WBC # BLD AUTO: 7.7 K/UL (ref 4.8–10.8)
WBC #/AREA URNS HPF: ABNORMAL /HPF

## 2022-07-02 PROCEDURE — A9270 NON-COVERED ITEM OR SERVICE: HCPCS | Performed by: EMERGENCY MEDICINE

## 2022-07-02 PROCEDURE — 85025 COMPLETE CBC W/AUTO DIFF WBC: CPT

## 2022-07-02 PROCEDURE — 84703 CHORIONIC GONADOTROPIN ASSAY: CPT

## 2022-07-02 PROCEDURE — 83690 ASSAY OF LIPASE: CPT

## 2022-07-02 PROCEDURE — 700102 HCHG RX REV CODE 250 W/ 637 OVERRIDE(OP): Performed by: EMERGENCY MEDICINE

## 2022-07-02 PROCEDURE — 80053 COMPREHEN METABOLIC PANEL: CPT

## 2022-07-02 PROCEDURE — 99284 EMERGENCY DEPT VISIT MOD MDM: CPT

## 2022-07-02 PROCEDURE — 74019 RADEX ABDOMEN 2 VIEWS: CPT

## 2022-07-02 PROCEDURE — 36415 COLL VENOUS BLD VENIPUNCTURE: CPT

## 2022-07-02 PROCEDURE — 81001 URINALYSIS AUTO W/SCOPE: CPT

## 2022-07-02 RX ORDER — SULFAMETHOXAZOLE AND TRIMETHOPRIM 800; 160 MG/1; MG/1
1 TABLET ORAL ONCE
Status: COMPLETED | OUTPATIENT
Start: 2022-07-02 | End: 2022-07-02

## 2022-07-02 RX ORDER — SULFAMETHOXAZOLE AND TRIMETHOPRIM 800; 160 MG/1; MG/1
1 TABLET ORAL 2 TIMES DAILY
Qty: 10 TABLET | Refills: 0 | Status: SHIPPED | OUTPATIENT
Start: 2022-07-02 | End: 2022-07-11

## 2022-07-02 RX ADMIN — SULFAMETHOXAZOLE AND TRIMETHOPRIM 1 TABLET: 800; 160 TABLET ORAL at 19:21

## 2022-07-02 ASSESSMENT — FIBROSIS 4 INDEX: FIB4 SCORE: 0.19

## 2022-07-03 LAB
FACT VIII ACT/NOR PPP: 101 % (ref 56–191)
VWF AG ACT/NOR PPP IA: 95 % (ref 52–214)
VWF:RCO ACT/NOR PPP PL AGG: 84 % (ref 51–215)

## 2022-07-03 PROCEDURE — RXMED WILLOW AMBULATORY MEDICATION CHARGE: Performed by: EMERGENCY MEDICINE

## 2022-07-03 NOTE — ED NOTES
Pt ambulated from triage to [place] [address] with triage RN. Pt reports bloody stools, abdominal pain, and diarrhea. States she is filling the whole toilet bowl with blood. Reports her normal iron level is 12. Pt changed into gown and connected to monitor. Chart up for ERP.

## 2022-07-03 NOTE — ED NOTES
Chief Complaint   Patient presents with   • Diarrhea     Onset this morning   • Bloody Stools     Bright red    • Abdominal Pain     Pt ambulated to triage onset of blood diarrhea this morning. Pt not on blood thinner but has history of anemia . She had recent blood drawn for Von Willebrand's which is still in process.

## 2022-07-03 NOTE — ED PROVIDER NOTES
ED Provider Note        Primary care provider: Ella Todd P.A.-C.    I verified that the patient was wearing a mask and I was wearing appropriate PPE every time I entered the room. The patient's mask was on the patient at all times during my encounter except for a brief view of the oropharynx.      CHIEF COMPLAINT  Chief Complaint   Patient presents with   • Diarrhea     Onset this morning   • Bloody Stools     Bright red    • Abdominal Pain       HPI  Mariah Muhammad is a 21 y.o. female who presents to the Emergency Department with chief complaint of diarrhea bloating bloody stools.  Patient reports that she has had loose stool throughout the day today.  She stated with the last couple bowel movements that seem to fill up the toilet bowl with bright red blood.  She reports moderate pain in the lower abdomen both left and right.  She has had no nausea no vomiting she states that over the last couple weeks she has had occasional dark tarry stool.  No vaginal bleeding or discharge denies pregnancy at this time she has had no fevers chills headache altered mental status cough congestion chest pain or shortness of breath no skin changes no other acute symptoms or concerns.  She does have a history of anemia she takes chronic iron pills which causes chronic constipation she states that she has been taking an oral stool softener for this.  Plans to start iron infusions next week.  She does state that she deals with severe constipation has done this ever since she was an infant.    REVIEW OF SYSTEMS  10 systems reviewed and otherwise negative, pertinent positives and negatives listed in the history of present illness.    PAST MEDICAL HISTORY   has a past medical history of Anemia and Eczema.    SURGICAL HISTORY   has a past surgical history that includes tonsillectomy; tonsillectomy; and adenoidectomy.    SOCIAL HISTORY  Social History     Tobacco Use   • Smoking status: Never Smoker   • Smokeless  "tobacco: Never Used   Vaping Use   • Vaping Use: Never used   Substance Use Topics   • Alcohol use: Never   • Drug use: Never      Social History     Substance and Sexual Activity   Drug Use Never       FAMILY HISTORY  Non-Contributory    CURRENT MEDICATIONS  Home Medications     Reviewed by Claudia Tanner R.N. (Registered Nurse) on 07/02/22 at 1734  Med List Status: Not Addressed   Medication Last Dose Status   ALPRAZolam (XANAX) 0.5 MG Tab  Active   etonogestrel (NEXPLANON) 68 MG Implant implant  Active   ferrous sulfate 325 (65 Fe) MG tablet  Active                ALLERGIESNo Known Allergies    PHYSICAL EXAM  VITAL SIGNS: BP (!) 148/88   Pulse 81   Temp 36.1 °C (96.9 °F) (Temporal)   Resp 18   Ht 1.651 m (5' 5\")   Wt 69.5 kg (153 lb 3.5 oz)   LMP 06/11/2022   SpO2 98%   BMI 25.50 kg/m²   Pulse ox interpretation: I interpret this pulse ox as normal.  Constitutional: Alert and oriented x 3, no acute distress  HEENT: Atraumatic normocephalic, pupils are equal round, extraocular movements are intact. The nares is clear, external ears are normal, mouth shows moist mucous membranes  Neck: no obvious JVD or tracheal deviation  Cardiovascular: Regular rate and rhythm no murmur rub or gallop   Thorax & Lungs: No respiratory distress, no wheezes rales or rhonchi, No chest tenderness.   GI: Soft nontender nondistended positive bowel sounds, no peritoneal signs  Rectal: Normal rectal tone Hemoccult negative no evidence of hemorrhoid or fissure  Skin: Warm dry no obvious acute rash or lesion  Musculoskeletal: Moving all extremities with normal range strength, no acute  deformity  Neurologic: Cranial nerves III through XII are grossly intact, no sensory deficit, no cerebellar dysfunction   Psychiatric: Appropriate affect for situation at this time      DIAGNOSTIC STUDIES / PROCEDURES  LABS      Results for orders placed or performed during the hospital encounter of 07/02/22   CBC WITH DIFFERENTIAL   Result Value Ref " Range    WBC 7.7 4.8 - 10.8 K/uL    RBC 4.26 4.20 - 5.40 M/uL    Hemoglobin 9.7 (L) 12.0 - 16.0 g/dL    Hematocrit 32.0 (L) 37.0 - 47.0 %    MCV 75.1 (L) 81.4 - 97.8 fL    MCH 22.8 (L) 27.0 - 33.0 pg    MCHC 30.3 (L) 33.6 - 35.0 g/dL    RDW 39.8 35.9 - 50.0 fL    Platelet Count 375 164 - 446 K/uL    MPV 11.1 9.0 - 12.9 fL    Neutrophils-Polys 52.00 44.00 - 72.00 %    Lymphocytes 37.40 22.00 - 41.00 %    Monocytes 7.70 0.00 - 13.40 %    Eosinophils 1.90 0.00 - 6.90 %    Basophils 0.60 0.00 - 1.80 %    Immature Granulocytes 0.40 0.00 - 0.90 %    Nucleated RBC 0.00 /100 WBC    Neutrophils (Absolute) 4.00 2.00 - 7.15 K/uL    Lymphs (Absolute) 2.88 1.00 - 4.80 K/uL    Monos (Absolute) 0.59 0.00 - 0.85 K/uL    Eos (Absolute) 0.15 0.00 - 0.51 K/uL    Baso (Absolute) 0.05 0.00 - 0.12 K/uL    Immature Granulocytes (abs) 0.03 0.00 - 0.11 K/uL    NRBC (Absolute) 0.00 K/uL   COMP METABOLIC PANEL   Result Value Ref Range    Sodium 140 135 - 145 mmol/L    Potassium 3.7 3.6 - 5.5 mmol/L    Chloride 105 96 - 112 mmol/L    Co2 22 20 - 33 mmol/L    Anion Gap 13.0 7.0 - 16.0    Glucose 89 65 - 99 mg/dL    Bun 10 8 - 22 mg/dL    Creatinine 0.68 0.50 - 1.40 mg/dL    Calcium 9.3 8.5 - 10.5 mg/dL    AST(SGOT) 11 (L) 12 - 45 U/L    ALT(SGPT) 10 2 - 50 U/L    Alkaline Phosphatase 66 30 - 99 U/L    Total Bilirubin 0.5 0.1 - 1.5 mg/dL    Albumin 5.0 (H) 3.2 - 4.9 g/dL    Total Protein 7.3 6.0 - 8.2 g/dL    Globulin 2.3 1.9 - 3.5 g/dL    A-G Ratio 2.2 g/dL   LIPASE   Result Value Ref Range    Lipase 41 11 - 82 U/L   HCG QUAL SERUM   Result Value Ref Range    Beta-Hcg Qualitative Serum Negative Negative   URINALYSIS,CULTURE IF INDICATED    Specimen: Urine   Result Value Ref Range    Color Yellow     Character Clear     Specific Gravity 1.006 <1.035    Ph 7.0 5.0 - 8.0    Glucose Negative Negative mg/dL    Ketones Negative Negative mg/dL    Protein Negative Negative mg/dL    Bilirubin Negative Negative    Urobilinogen, Urine 0.2 Negative     "Nitrite Negative Negative    Leukocyte Esterase Small (A) Negative    Occult Blood Negative Negative    Micro Urine Req Microscopic    ESTIMATED GFR   Result Value Ref Range    GFR (CKD-EPI) 128 >60 mL/min/1.73 m 2   URINE MICROSCOPIC (W/UA)   Result Value Ref Range    WBC 5-10 (A) /hpf    RBC 0-2 /hpf    Bacteria Few (A) None /hpf    Epithelial Cells Few /hpf    Hyaline Cast 0-2 /lpf       All labs reviewed by me.      RADIOLOGY  [id number] VIEWS   Final Result      1.  No evidence of bowel obstruction. Nonspecific bowel gas pattern.   2.  Moderate amount of colonic stool in the ascending colon.            COURSE & MEDICAL DECISION MAKING  Pertinent Labs & Imaging studies reviewed. (See chart for details)    5:57 PM - Patient seen and examined at bedside.       Patient noted to have slightly elevated blood pressure likely circumstantial secondary to presenting complaint. Referred to primary care physician for further evaluation.      Medical Decision Makin-year-old female with bloody stool here and some mild abdominal pain her H&H is stable from multiple previous values Hemoccult is negative her x-ray films are unremarkable her abdominal exam is benign vital signs are improved.  We discussed avoiding radiation with unnecessary CAT scan at this time she understands return should she have worsening abdominal pain ongoing bleeding dizziness lightheadedness palpitations or.  Her urine does show signs of infection she is placed on Bactrim for urinary tract infection.  She will return here for any other worsening symptoms changes or concerns otherwise discharged in stable and improved condition.    BP (!) 148/88   Pulse 81   Temp 36.1 °C (96.9 °F) (Temporal)   Resp 18   Ht 1.651 m (5' 5\")   Wt 69.5 kg (153 lb 3.5 oz)   LMP 2022   SpO2 98%   BMI 25.50 kg/m²     Ella Todd, P.A.-C.  [address] [address]  [place] [address] [phone]    In 2 days  if symptoms persist    [facility] " , Emergency Dept  [address] [facility] [address]  [phone]    in 12-24 hours if symptoms persist, immediately If symptoms worsen, or if you develop any other symptoms or concerns      Discharge Medication List as of 7/2/2022  7:26 PM      START taking these medications    Details   sulfamethoxazole-trimethoprim (BACTRIM DS) 800-160 MG tablet Take 1 Tablet by mouth 2 times a day for 5 days., Disp-10 Tablet, R-0, Normal             FINAL IMPRESSION  1. Urinary tract infection without hematuria, site unspecified Active   2. Bloody diarrhea Active          This dictation has been created using voice recognition software and/or scribes. The accuracy of the dictation is limited by the abilities of the software and the expertise of the scribes. I expect there may be some errors of grammar and possibly content. I made every attempt to manually correct the errors within my dictation. However, errors related to voice recognition software and/or scribes may still exist and should be interpreted within the appropriate context.

## 2022-07-03 NOTE — ED NOTES
Patient medicated per MAR. Patient given discharge instructions and verbalized understanding appropriately, denies any further questions or concerns at this time. Patient is alert and oriented and ambulates with a steady gait. Discharged to self.

## 2022-07-06 ENCOUNTER — PHARMACY VISIT (OUTPATIENT)
Dept: PHARMACY | Facility: MEDICAL CENTER | Age: 22
End: 2022-07-06
Payer: COMMERCIAL

## 2022-07-07 ENCOUNTER — HOSPITAL ENCOUNTER (OUTPATIENT)
Facility: MEDICAL CENTER | Age: 22
End: 2022-07-07
Attending: NURSE PRACTITIONER
Payer: COMMERCIAL

## 2022-07-07 ENCOUNTER — OFFICE VISIT (OUTPATIENT)
Dept: URGENT CARE | Facility: CLINIC | Age: 22
End: 2022-07-07
Payer: COMMERCIAL

## 2022-07-07 ENCOUNTER — PHARMACY VISIT (OUTPATIENT)
Dept: PHARMACY | Facility: MEDICAL CENTER | Age: 22
End: 2022-07-07
Payer: MEDICARE

## 2022-07-07 VITALS
DIASTOLIC BLOOD PRESSURE: 70 MMHG | SYSTOLIC BLOOD PRESSURE: 118 MMHG | HEIGHT: 65 IN | OXYGEN SATURATION: 100 % | HEART RATE: 76 BPM | RESPIRATION RATE: 18 BRPM | WEIGHT: 147.8 LBS | TEMPERATURE: 98.8 F | BODY MASS INDEX: 24.62 KG/M2

## 2022-07-07 DIAGNOSIS — N39.0 URINARY TRACT INFECTION WITHOUT HEMATURIA, SITE UNSPECIFIED: ICD-10-CM

## 2022-07-07 DIAGNOSIS — M54.50 ACUTE LOW BACK PAIN WITHOUT SCIATICA, UNSPECIFIED BACK PAIN LATERALITY: ICD-10-CM

## 2022-07-07 DIAGNOSIS — R11.0 NAUSEA: ICD-10-CM

## 2022-07-07 LAB
APPEARANCE UR: CLEAR
BILIRUB UR STRIP-MCNC: NEGATIVE MG/DL
COLOR UR AUTO: YELLOW
GLUCOSE UR STRIP.AUTO-MCNC: NEGATIVE MG/DL
INT CON NEG: NORMAL
INT CON POS: NORMAL
KETONES UR STRIP.AUTO-MCNC: NEGATIVE MG/DL
LEUKOCYTE ESTERASE UR QL STRIP.AUTO: NORMAL
NITRITE UR QL STRIP.AUTO: NEGATIVE
PH UR STRIP.AUTO: 5.5 [PH] (ref 5–8)
POC URINE PREGNANCY TEST: NEGATIVE
PROT UR QL STRIP: NEGATIVE MG/DL
RBC UR QL AUTO: NEGATIVE
SP GR UR STRIP.AUTO: 1.02
UROBILINOGEN UR STRIP-MCNC: 0.2 MG/DL

## 2022-07-07 PROCEDURE — 99214 OFFICE O/P EST MOD 30 MIN: CPT | Performed by: NURSE PRACTITIONER

## 2022-07-07 PROCEDURE — 81002 URINALYSIS NONAUTO W/O SCOPE: CPT | Performed by: NURSE PRACTITIONER

## 2022-07-07 PROCEDURE — 87086 URINE CULTURE/COLONY COUNT: CPT

## 2022-07-07 PROCEDURE — 81025 URINE PREGNANCY TEST: CPT | Performed by: NURSE PRACTITIONER

## 2022-07-07 PROCEDURE — RXMED WILLOW AMBULATORY MEDICATION CHARGE: Performed by: NURSE PRACTITIONER

## 2022-07-07 RX ORDER — MIRTAZAPINE 15 MG/1
TABLET, FILM COATED ORAL
COMMUNITY
Start: 2022-05-26

## 2022-07-07 RX ORDER — ONDANSETRON 4 MG/1
4 TABLET, ORALLY DISINTEGRATING ORAL EVERY 6 HOURS PRN
Qty: 10 TABLET | Refills: 0 | Status: SHIPPED | OUTPATIENT
Start: 2022-07-07

## 2022-07-07 RX ORDER — CYCLOBENZAPRINE HCL 5 MG
5-10 TABLET ORAL EVERY 8 HOURS PRN
Qty: 30 TABLET | Refills: 0 | Status: SHIPPED | OUTPATIENT
Start: 2022-07-07

## 2022-07-07 RX ORDER — CEFDINIR 300 MG/1
300 CAPSULE ORAL 2 TIMES DAILY
Qty: 10 CAPSULE | Refills: 0 | Status: SHIPPED | OUTPATIENT
Start: 2022-07-07 | End: 2022-07-12

## 2022-07-07 ASSESSMENT — ENCOUNTER SYMPTOMS
FEVER: 1
NAUSEA: 1
ABDOMINAL PAIN: 0
NEUROLOGICAL NEGATIVE: 1
RESPIRATORY NEGATIVE: 1
CARDIOVASCULAR NEGATIVE: 1
BACK PAIN: 1
VOMITING: 1

## 2022-07-07 ASSESSMENT — VISUAL ACUITY: OU: 1

## 2022-07-07 ASSESSMENT — FIBROSIS 4 INDEX: FIB4 SCORE: 0.18

## 2022-07-07 NOTE — PATIENT INSTRUCTIONS
May use gentle massage, stretching, and range of motion exercises as tolerated. May apply heat up to 20 minutes at a time. May use over-the-counter acetaminophen alternating with ibuprofen, per 's instructions, for additional pain relief.

## 2022-07-07 NOTE — PROGRESS NOTES
Subjective:     Mariah Muhammad is a 21 y.o. female who presents for Back Pain (Lower Back pain/fever 101/nausea/ UTI follow up/[id number])       Back Pain  Associated symptoms include a fever. Pertinent negatives include no abdominal pain or dysuria.     Prior external notes from unique source dated 7/2/2022 reviewed: seen in the ER.  Patient was having symptoms of diarrhea, bloating, and bloody stools.  Was started on Bactrim DS for treatment of UTI.  Small amount of LE noted on urinalysis.    Unique test result dated 7/2/2022 reviewed:  X-ray of abdomen showed moderate amount of colonic stool.  Patient reports is chronic.     Patient reports finishing the antibiotic.  However, yesterday, started to develop severe bilateral lower back pain.  Had a call out from work.  Pain worsens with range of motion and palpation.  Also had a fever last night of 101.  Teaberry nauseous and vomited.    Review of Systems   Constitutional: Positive for fever.   Respiratory: Negative.    Cardiovascular: Negative.    Gastrointestinal: Positive for nausea and vomiting. Negative for abdominal pain.   Genitourinary: Negative.  Negative for dysuria, frequency and urgency.   Musculoskeletal: Positive for back pain.   Neurological: Negative.    All other systems reviewed and are negative.    During this visit, appropriate PPE was worn, hand hygiene was performed, and the patient and any visitors were masked.    PMH:  has a past medical history of Anemia and Eczema.    MEDS:   Current Outpatient Medications:   •  cyclobenzaprine (FLEXERIL) 5 mg tablet, Take 1-2 Tablets by mouth every 8 hours as needed for Muscle Spasms., Disp: 30 Tablet, Rfl: 0  •  cefdinir (OMNICEF) 300 MG Cap, Take 1 Capsule by mouth 2 times a day for 5 days., Disp: 10 Capsule, Rfl: 0  •  ondansetron (ZOFRAN ODT) 4 MG TABLET DISPERSIBLE, Take 1 Tablet by mouth every 6 hours as needed for Nausea. Dissolve in mouth., Disp: 10 Tablet, Rfl: 0  •   "sulfamethoxazole-trimethoprim (BACTRIM DS) 800-160 MG tablet, Take 1 Tablet by mouth 2 times a day for 5 days., Disp: 10 Tablet, Rfl: 0  •  ferrous sulfate 325 (65 Fe) MG tablet, Take 325 mg by mouth every day., Disp: , Rfl:   •  etonogestrel (NEXPLANON) 68 MG Implant implant, Inject 1 Each under the skin., Disp: , Rfl:   •  ALPRAZolam (XANAX) 0.5 MG Tab, Take 0.5 mg by mouth 3 times a day as needed for Sleep or Anxiety., Disp: , Rfl:     ALLERGIES: No Known Allergies    SURGHX:   Past Surgical History:   Procedure Laterality Date   • ADENOIDECTOMY     • TONSILLECTOMY     • TONSILLECTOMY       SOCHX:  reports that she has never smoked. She has never used smokeless tobacco. She reports that she does not drink alcohol and does not use drugs.    FH: Per HPI as applicable/pertinent      Objective:     /70 (BP Location: Left arm, Patient Position: Sitting)   Pulse 76   Temp 37.1 °C (98.8 °F) (Temporal)   Resp 18   Ht 1.651 m (5' 5\")   Wt 67 kg (147 lb 12.8 oz)   LMP 06/11/2022 (Exact Date)   SpO2 100%   BMI 24.60 kg/m²     Physical Exam  Nursing note reviewed.   Constitutional:       General: She is not in acute distress.     Appearance: She is well-developed. She is not ill-appearing or toxic-appearing.   Eyes:      General: Vision grossly intact.   Cardiovascular:      Rate and Rhythm: Normal rate.   Pulmonary:      Effort: Pulmonary effort is normal. No respiratory distress.   Abdominal:      Tenderness: There is no right CVA tenderness or left CVA tenderness.   Musculoskeletal:         General: No deformity. Normal range of motion.      Lumbar back: Tenderness (Bilateral paraspinous musculature) present. No swelling or deformity. Normal range of motion.   Skin:     Coloration: Skin is not pale.   Neurological:      Mental Status: She is alert and oriented to person, place, and time.      Motor: No weakness.      Gait: Gait normal.   Psychiatric:         Behavior: Behavior normal. Behavior is " cooperative.     UA: small LE    POCT pregnancy: negative      Assessment/Plan:     1. Acute low back pain without sciatica, unspecified back pain laterality  - POCT Urinalysis  - POCT PREGNANCY  - cyclobenzaprine (FLEXERIL) 5 mg tablet; Take 1-2 Tablets by mouth every 8 hours as needed for Muscle Spasms.  Dispense: 30 Tablet; Refill: 0    2. Urinary tract infection without hematuria, site unspecified  - URINE CULTURE(NEW); Future  - cefdinir (OMNICEF) 300 MG Cap; Take 1 Capsule by mouth 2 times a day for 5 days.  Dispense: 10 Capsule; Refill: 0    3. Nausea  - ondansetron (ZOFRAN ODT) 4 MG TABLET DISPERSIBLE; Take 1 Tablet by mouth every 6 hours as needed for Nausea. Dissolve in mouth.  Dispense: 10 Tablet; Refill: 0    Rx as above sent electronically. Increase fluids.    May use gentle massage, stretching, and range of motion exercises as tolerated. May apply heat up to 20 minutes at a time. May use over-the-counter acetaminophen alternating with ibuprofen, per 's instructions, for additional pain relief.     Differential diagnosis, natural history, supportive care, over-the-counter symptom management per 's instructions, close monitoring, and indications for immediate follow-up discussed.     All questions answered. Patient agrees with the plan of care.    Discharge summary provided.    Work note provided.

## 2022-07-07 NOTE — LETTER
July 7, 2022         Patient: Mariah Muhammad   YOB: 2000   Date of Visit: 7/7/2022           To Whom it May Concern:    Mariah Muhammad was seen in my clinic on 7/7/2022 due to illness. Due to medical necessity, please excuse patient from work 7/6 and 7/7 as well as for the next 3 days as needed.     If you have any questions or concerns, please don't hesitate to call.        Sincerely,         LAI Harris.  Electronically Signed

## 2022-07-10 LAB
BACTERIA UR CULT: NORMAL
SIGNIFICANT IND 70042: NORMAL
SITE SITE: NORMAL
SOURCE SOURCE: NORMAL

## 2022-07-13 ENCOUNTER — EH NON-PROVIDER (OUTPATIENT)
Dept: OCCUPATIONAL MEDICINE | Facility: CLINIC | Age: 22
End: 2022-07-13

## 2022-07-18 ENCOUNTER — OUTPATIENT INFUSION SERVICES (OUTPATIENT)
Dept: ONCOLOGY | Facility: MEDICAL CENTER | Age: 22
End: 2022-07-18
Attending: PHYSICIAN ASSISTANT
Payer: COMMERCIAL

## 2022-07-18 VITALS
WEIGHT: 150.57 LBS | SYSTOLIC BLOOD PRESSURE: 131 MMHG | RESPIRATION RATE: 18 BRPM | TEMPERATURE: 98.4 F | OXYGEN SATURATION: 99 % | HEART RATE: 69 BPM | DIASTOLIC BLOOD PRESSURE: 80 MMHG | BODY MASS INDEX: 25.09 KG/M2 | HEIGHT: 65 IN

## 2022-07-18 DIAGNOSIS — N92.1 MENORRHAGIA WITH IRREGULAR CYCLE: ICD-10-CM

## 2022-07-18 DIAGNOSIS — D50.0 IRON DEFICIENCY ANEMIA DUE TO CHRONIC BLOOD LOSS: ICD-10-CM

## 2022-07-18 LAB
FERRITIN SERPL-MCNC: 7.1 NG/ML (ref 10–291)
IRON SATN MFR SERPL: 5 % (ref 15–55)
IRON SERPL-MCNC: 18 UG/DL (ref 40–170)
TIBC SERPL-MCNC: 383 UG/DL (ref 250–450)
UIBC SERPL-MCNC: 365 UG/DL (ref 110–370)

## 2022-07-18 PROCEDURE — 700105 HCHG RX REV CODE 258: Performed by: PHYSICIAN ASSISTANT

## 2022-07-18 PROCEDURE — 82728 ASSAY OF FERRITIN: CPT

## 2022-07-18 PROCEDURE — 36415 COLL VENOUS BLD VENIPUNCTURE: CPT

## 2022-07-18 PROCEDURE — 700111 HCHG RX REV CODE 636 W/ 250 OVERRIDE (IP): Performed by: PHYSICIAN ASSISTANT

## 2022-07-18 PROCEDURE — 96365 THER/PROPH/DIAG IV INF INIT: CPT

## 2022-07-18 PROCEDURE — 83540 ASSAY OF IRON: CPT

## 2022-07-18 PROCEDURE — 83550 IRON BINDING TEST: CPT

## 2022-07-18 RX ORDER — 0.9 % SODIUM CHLORIDE 0.9 %
10 VIAL (ML) INJECTION PRN
Status: CANCELLED | OUTPATIENT
Start: 2022-07-25

## 2022-07-18 RX ORDER — SODIUM CHLORIDE 9 MG/ML
INJECTION, SOLUTION INTRAVENOUS CONTINUOUS
Status: CANCELLED | OUTPATIENT
Start: 2022-07-25

## 2022-07-18 RX ORDER — EPINEPHRINE 1 MG/ML(1)
0.5 AMPUL (ML) INJECTION PRN
Status: CANCELLED | OUTPATIENT
Start: 2022-07-25

## 2022-07-18 RX ORDER — METHYLPREDNISOLONE SODIUM SUCCINATE 125 MG/2ML
125 INJECTION, POWDER, LYOPHILIZED, FOR SOLUTION INTRAMUSCULAR; INTRAVENOUS PRN
Status: CANCELLED | OUTPATIENT
Start: 2022-07-25

## 2022-07-18 RX ORDER — HEPARIN SODIUM (PORCINE) LOCK FLUSH IV SOLN 100 UNIT/ML 100 UNIT/ML
500 SOLUTION INTRAVENOUS PRN
Status: CANCELLED | OUTPATIENT
Start: 2022-07-25

## 2022-07-18 RX ORDER — 0.9 % SODIUM CHLORIDE 0.9 %
VIAL (ML) INJECTION PRN
Status: CANCELLED | OUTPATIENT
Start: 2022-07-25

## 2022-07-18 RX ORDER — 0.9 % SODIUM CHLORIDE 0.9 %
3 VIAL (ML) INJECTION PRN
Status: CANCELLED | OUTPATIENT
Start: 2022-07-25

## 2022-07-18 RX ORDER — SODIUM CHLORIDE 9 MG/ML
INJECTION, SOLUTION INTRAVENOUS CONTINUOUS
Status: DISCONTINUED | OUTPATIENT
Start: 2022-07-18 | End: 2022-07-18 | Stop reason: HOSPADM

## 2022-07-18 RX ORDER — DIPHENHYDRAMINE HYDROCHLORIDE 50 MG/ML
50 INJECTION INTRAMUSCULAR; INTRAVENOUS PRN
Status: CANCELLED | OUTPATIENT
Start: 2022-07-25

## 2022-07-18 RX ADMIN — FERUMOXYTOL 510 MG: 510 INJECTION INTRAVENOUS at 17:41

## 2022-07-18 RX ADMIN — SODIUM CHLORIDE: 9 INJECTION, SOLUTION INTRAVENOUS at 17:38

## 2022-07-18 ASSESSMENT — FIBROSIS 4 INDEX: FIB4 SCORE: 0.18

## 2022-07-19 NOTE — PROGRESS NOTES
Pt presents to [facility] for Feraheme. Established PIV in R FA; brisk blood return observed and pt very anxious about needles. Labs drawn via butterfly needle to patient's hand as PIV wouldn't give good blood return. No parameters in treatment plan, pharmacist okay w/ infusion prior to labs returning as last labs in May were very low. Feraheme infused with no s/s of adverse reactions, 30 minutes of observation done w/ no reactions as well.  PIV flushed and brisk blood return observed before removing; gauze/coband dressing applied. Next appointment printed for patient. Pt discharged to self care with all personal belongings and in NAD.

## 2022-07-21 ENCOUNTER — HOSPITAL ENCOUNTER (OUTPATIENT)
Facility: MEDICAL CENTER | Age: 22
End: 2022-07-21
Attending: PREVENTIVE MEDICINE
Payer: COMMERCIAL

## 2022-07-21 LAB — COVID ORDER STATUS COVID19: NORMAL

## 2022-07-22 LAB
SARS-COV-2 RNA RESP QL NAA+PROBE: DETECTED
SPECIMEN SOURCE: ABNORMAL

## 2022-07-25 ENCOUNTER — OUTPATIENT INFUSION SERVICES (OUTPATIENT)
Dept: ONCOLOGY | Facility: MEDICAL CENTER | Age: 22
End: 2022-07-25
Attending: PHYSICIAN ASSISTANT
Payer: COMMERCIAL

## 2022-07-25 ENCOUNTER — HOSPITAL ENCOUNTER (EMERGENCY)
Facility: MEDICAL CENTER | Age: 22
End: 2022-07-25
Attending: EMERGENCY MEDICINE
Payer: COMMERCIAL

## 2022-07-25 ENCOUNTER — APPOINTMENT (OUTPATIENT)
Dept: RADIOLOGY | Facility: MEDICAL CENTER | Age: 22
End: 2022-07-25
Attending: EMERGENCY MEDICINE
Payer: COMMERCIAL

## 2022-07-25 ENCOUNTER — TELEPHONE (OUTPATIENT)
Dept: ONCOLOGY | Facility: MEDICAL CENTER | Age: 22
End: 2022-07-25
Payer: COMMERCIAL

## 2022-07-25 VITALS
DIASTOLIC BLOOD PRESSURE: 80 MMHG | RESPIRATION RATE: 18 BRPM | OXYGEN SATURATION: 99 % | BODY MASS INDEX: 24.43 KG/M2 | WEIGHT: 146.61 LBS | TEMPERATURE: 98.6 F | SYSTOLIC BLOOD PRESSURE: 127 MMHG | HEART RATE: 68 BPM

## 2022-07-25 DIAGNOSIS — D50.9 IRON DEFICIENCY ANEMIA, UNSPECIFIED IRON DEFICIENCY ANEMIA TYPE: ICD-10-CM

## 2022-07-25 DIAGNOSIS — U07.1 COVID: ICD-10-CM

## 2022-07-25 LAB
ALBUMIN SERPL BCP-MCNC: 4.8 G/DL (ref 3.2–4.9)
ALBUMIN/GLOB SERPL: 1.7 G/DL
ALP SERPL-CCNC: 66 U/L (ref 30–99)
ALT SERPL-CCNC: 19 U/L (ref 2–50)
ANION GAP SERPL CALC-SCNC: 14 MMOL/L (ref 7–16)
APTT PPP: 30.3 SEC (ref 24.7–36)
AST SERPL-CCNC: 19 U/L (ref 12–45)
BASOPHILS # BLD AUTO: 0.5 % (ref 0–1.8)
BASOPHILS # BLD: 0.03 K/UL (ref 0–0.12)
BILIRUB SERPL-MCNC: 0.4 MG/DL (ref 0.1–1.5)
BUN SERPL-MCNC: 9 MG/DL (ref 8–22)
CALCIUM SERPL-MCNC: 9.3 MG/DL (ref 8.5–10.5)
CHLORIDE SERPL-SCNC: 107 MMOL/L (ref 96–112)
CO2 SERPL-SCNC: 21 MMOL/L (ref 20–33)
CREAT SERPL-MCNC: 0.7 MG/DL (ref 0.5–1.4)
D DIMER PPP IA.FEU-MCNC: 0.27 UG/ML (FEU) (ref 0–0.5)
EOSINOPHIL # BLD AUTO: 0.09 K/UL (ref 0–0.51)
EOSINOPHIL NFR BLD: 1.6 % (ref 0–6.9)
ERYTHROCYTE [DISTWIDTH] IN BLOOD BY AUTOMATED COUNT: 40.9 FL (ref 35.9–50)
FLUAV RNA SPEC QL NAA+PROBE: NEGATIVE
FLUBV RNA SPEC QL NAA+PROBE: NEGATIVE
GFR SERPLBLD CREATININE-BSD FMLA CKD-EPI: 127 ML/MIN/1.73 M 2
GLOBULIN SER CALC-MCNC: 2.8 G/DL (ref 1.9–3.5)
GLUCOSE SERPL-MCNC: 83 MG/DL (ref 65–99)
HCG SERPL QL: NEGATIVE
HCT VFR BLD AUTO: 39.3 % (ref 37–47)
HGB BLD-MCNC: 12 G/DL (ref 12–16)
IMM GRANULOCYTES # BLD AUTO: 0.01 K/UL (ref 0–0.11)
IMM GRANULOCYTES NFR BLD AUTO: 0.2 % (ref 0–0.9)
INR PPP: 1.16 (ref 0.87–1.13)
LACTATE SERPL-SCNC: 1.4 MMOL/L (ref 0.5–2)
LYMPHOCYTES # BLD AUTO: 1.25 K/UL (ref 1–4.8)
LYMPHOCYTES NFR BLD: 22.4 % (ref 22–41)
MCH RBC QN AUTO: 22.6 PG (ref 27–33)
MCHC RBC AUTO-ENTMCNC: 30.5 G/DL (ref 33.6–35)
MCV RBC AUTO: 73.9 FL (ref 81.4–97.8)
MONOCYTES # BLD AUTO: 0.3 K/UL (ref 0–0.85)
MONOCYTES NFR BLD AUTO: 5.4 % (ref 0–13.4)
NEUTROPHILS # BLD AUTO: 3.91 K/UL (ref 2–7.15)
NEUTROPHILS NFR BLD: 69.9 % (ref 44–72)
NRBC # BLD AUTO: 0 K/UL
NRBC BLD-RTO: 0 /100 WBC
PLATELET # BLD AUTO: 384 K/UL (ref 164–446)
PMV BLD AUTO: 11.5 FL (ref 9–12.9)
POTASSIUM SERPL-SCNC: 4 MMOL/L (ref 3.6–5.5)
PROT SERPL-MCNC: 7.6 G/DL (ref 6–8.2)
PROTHROMBIN TIME: 14.7 SEC (ref 12–14.6)
RBC # BLD AUTO: 5.32 M/UL (ref 4.2–5.4)
RSV RNA SPEC QL NAA+PROBE: NEGATIVE
SARS-COV-2 RNA RESP QL NAA+PROBE: DETECTED
SODIUM SERPL-SCNC: 142 MMOL/L (ref 135–145)
SPECIMEN SOURCE: ABNORMAL
WBC # BLD AUTO: 5.6 K/UL (ref 4.8–10.8)

## 2022-07-25 PROCEDURE — 71045 X-RAY EXAM CHEST 1 VIEW: CPT

## 2022-07-25 PROCEDURE — 85730 THROMBOPLASTIN TIME PARTIAL: CPT

## 2022-07-25 PROCEDURE — 36415 COLL VENOUS BLD VENIPUNCTURE: CPT

## 2022-07-25 PROCEDURE — C9803 HOPD COVID-19 SPEC COLLECT: HCPCS | Performed by: EMERGENCY MEDICINE

## 2022-07-25 PROCEDURE — 84703 CHORIONIC GONADOTROPIN ASSAY: CPT

## 2022-07-25 PROCEDURE — 85379 FIBRIN DEGRADATION QUANT: CPT

## 2022-07-25 PROCEDURE — 0241U HCHG SARS-COV-2 COVID-19 NFCT DS RESP RNA 4 TRGT MIC: CPT

## 2022-07-25 PROCEDURE — 85025 COMPLETE CBC W/AUTO DIFF WBC: CPT

## 2022-07-25 PROCEDURE — 99284 EMERGENCY DEPT VISIT MOD MDM: CPT

## 2022-07-25 PROCEDURE — 83605 ASSAY OF LACTIC ACID: CPT

## 2022-07-25 PROCEDURE — 85610 PROTHROMBIN TIME: CPT

## 2022-07-25 PROCEDURE — 80053 COMPREHEN METABOLIC PANEL: CPT

## 2022-07-25 ASSESSMENT — FIBROSIS 4 INDEX: FIB4 SCORE: 0.18

## 2022-07-25 NOTE — PROGRESS NOTES
[facility]. Patient to be rescheduled.

## 2022-07-25 NOTE — ED PROVIDER NOTES
ED Provider Note    CHIEF COMPLAINT  Chief Complaint   Patient presents with   • Sent by MD     Patient states hx of low Iron with transfusion.    • Shortness of Breath     Patient reports SOB flaquito 7/17/22, tested + for covid on 7/22/22.        HPI  Mariah Muhammad is a 21 y.o. female who presents for evaluation of cough and shortness of breath.  Patient states she was diagnosed with COVID last week when she went to the urgent care.  Patient also has a history of anemia and 1 week ago she underwent iron transfusion.  The patient has low total iron along with low TIBC.  The patient complains of progressive shortness of breath along with a cough with mild chest pain with coughing.  Patient denies: Hemoptysis, vomiting, hematemesis, melena hematochezia, hematuria, dysuria.  No other acute symptomatology or complaints.    REVIEW OF SYSTEMS  See HPI for further details.  The patient does have a contraceptive implant in her arm.  She denies a history of: Hypertension, diabetes, thyroid dysfunction, heart disease.  All other systems negative.    PAST MEDICAL HISTORY  Past Medical History:   Diagnosis Date   • Anemia    • Eczema        FAMILY HISTORY  History reviewed. No pertinent family history.    SOCIAL HISTORY  Resides locally;    SURGICAL HISTORY  Past Surgical History:   Procedure Laterality Date   • ADENOIDECTOMY     • TONSILLECTOMY     • TONSILLECTOMY         CURRENT MEDICATIONS  Home Medications     Reviewed by Jya Rizvi R.N. (Registered Nurse) on 07/25/22 at 1118  Med List Status: Partial   Medication Last Dose Status   ALPRAZolam (XANAX) 0.5 MG Tab  Active   cyclobenzaprine (FLEXERIL) 5 mg tablet  Active   etonogestrel (NEXPLANON) 68 MG Implant implant  Active   ferrous sulfate 325 (65 Fe) MG tablet  Active   mirtazapine (REMERON) 15 MG Tab  Active   ondansetron (ZOFRAN ODT) 4 MG TABLET DISPERSIBLE  Active                ALLERGIES  No Known Allergies    PHYSICAL EXAM  VITAL SIGNS: BP (!) 174/95    Pulse (!) 101   Temp 36.7 °C (98 °F) (Temporal)   Resp 18   Wt 66.5 kg (146 lb 9.7 oz)   SpO2 100%   BMI 24.43 kg/m²    Constitutional: 21-year-old female, awake, oriented x3  HENT: ,Atraumatic, Bilateral external ears normal, tympanic membranes clear, Oropharynx moist, No oral exudates, Nose normal.   Eyes: PERRL, EOMI, Conjunctiva normal, No discharge.   Neck: Normal range of motion, No tenderness, Supple, No stridor.   Lymphatic: No lymphadenopathy noted.   Cardiovascular: Normal heart rate, Normal rhythm, No murmurs, No rubs, No gallops.   Thorax & Lungs: Normal Equal breath sounds, No respiratory distress, No wheezing, no stridor, no rales. No chest tenderness.   Abdomen: Soft, nontender, nondistended, no organomegaly, positive bowel sounds normal in quality. No guarding or rebound.  Skin: Good skin turgor, pink, warm, dry. No rashes, petechiae, purpura. Normal capillary refill.   Back: No tenderness, No CVA tenderness.   Extremities: Intact distal pulses, No edema, No tenderness, No cyanosis, No clubbing. Vascular: Pulses are 2+, symmetric in the upper and lower extremities.  Musculoskeletal: Good range of motion in all major joints. No tenderness to palpation or major deformities noted.   Neurologic: Alert & oriented x 3, Normal motor function, Normal sensory function, No gross focal deficits noted.   Psychiatric: Affect normal, Judgment normal, Mood normal.     RADIOLOGY/PROCEDURES  DX-CHEST-PORTABLE (1 VIEW)   Final Result      No acute cardiac or pulmonary abnormalities are identified.            COURSE & MEDICAL DECISION MAKING  Pertinent Labs & Imaging studies reviewed. (See chart for details)  1.  Saline lock    Laboratory studies: CBC shows white count of 5.6, 69% neutrophils, 23% lymphocytes, 5% monocytes, hemoglobin 12.0 hematocrit 39.3, MCV low at 73, MCHC low at 30; on 7/18/2022 she had low iron at 18, percent saturation 5; CMP within normal limits; lactic acid 1.4; beta-hCG is negative;  D-dimer normal at 0.27; coags within normal;    Discussion: At this time, the patient presents for evaluation of chest pain and shortness of breath.  Chest x-ray did not show any evidence of focal bacterial pneumonias.  Patient is at risk for pulmonary embolism but her D-dimer is normal.  The patient's hemoglobin hematocrit have improved dramatically compared to her last 1.  She is only had 1 infusion of the iron.  I spoke with pharmacy and they thought that the patient should not receive the transfusion today and wait until she is no longer symptomatic from her COVID as this probably put her at higher risk for potential reaction.  Patient looks well clinically.  Based on the work-up and findings, patient is stable for discharge.  Patient's chest pain is pleuritic in nature and is most suggestive of findings associated with COVID.  I discussed the findings treatment plan with the patient.  She indicates comfortable with this explanation disposition.    FINAL IMPRESSION  1. COVID    2. Iron deficiency anemia, unspecified iron deficiency anemia type           PLAN  1.  Appropriate discharge directions given  2.  Follow-up with primary care  3.  Follow-up for second iron transfusion after she is no longer symptomatic from COVID    Electronically signed by: Guy G Gansert, M.D., 7/25/2022 12:52 PM

## 2022-07-25 NOTE — ED TRIAGE NOTES
.Mariah Muhammad  .  Chief Complaint   Patient presents with   • Sent by MD     Patient states hx of low Iron with transfusion.    • Shortness of Breath     Patient reports SOB sonce 7/17/22, tested + for covid on 7/22/22.      Patient ambulatory to triage with above complaints. Patient was scheduled for iron transfusion today though could not go due to being Covid +. Patient appears slightly pale.     Charge RN aware of patient.   Patient to lobby and instructed to inform staff of any needs.

## 2022-08-22 ENCOUNTER — OCCUPATIONAL MEDICINE (OUTPATIENT)
Dept: URGENT CARE | Facility: PHYSICIAN GROUP | Age: 22
End: 2022-08-22
Payer: COMMERCIAL

## 2022-08-22 ENCOUNTER — APPOINTMENT (OUTPATIENT)
Dept: RADIOLOGY | Facility: IMAGING CENTER | Age: 22
End: 2022-08-22
Payer: COMMERCIAL

## 2022-08-22 ENCOUNTER — APPOINTMENT (OUTPATIENT)
Dept: URGENT CARE | Facility: CLINIC | Age: 22
End: 2022-08-22
Payer: COMMERCIAL

## 2022-08-22 VITALS
RESPIRATION RATE: 18 BRPM | DIASTOLIC BLOOD PRESSURE: 68 MMHG | HEART RATE: 92 BPM | HEIGHT: 65 IN | BODY MASS INDEX: 24.32 KG/M2 | OXYGEN SATURATION: 98 % | WEIGHT: 146 LBS | SYSTOLIC BLOOD PRESSURE: 130 MMHG | TEMPERATURE: 98.2 F

## 2022-08-22 DIAGNOSIS — S96.912A ANKLE STRAIN, LEFT, INITIAL ENCOUNTER: ICD-10-CM

## 2022-08-22 DIAGNOSIS — S99.912A INJURY OF LEFT ANKLE, INITIAL ENCOUNTER: ICD-10-CM

## 2022-08-22 DIAGNOSIS — Z02.1 PRE-EMPLOYMENT DRUG SCREENING: ICD-10-CM

## 2022-08-22 LAB
AMP AMPHETAMINE: NORMAL
BAR BARBITURATES: NORMAL
BREATH ALCOHOL COMMENT: NORMAL
BZO BENZODIAZEPINES: NORMAL
COC COCAINE: NORMAL
INT CON NEG: NORMAL
INT CON POS: NORMAL
MDMA ECSTASY: NORMAL
MET METHAMPHETAMINES: NORMAL
MTD METHADONE: NORMAL
OPI OPIATES: NORMAL
OXY OXYCODONE: NORMAL
PCP PHENCYCLIDINE: NORMAL
POC BREATHALIZER: 0 PERCENT (ref 0–0.01)
POC URINE DRUG SCREEN OCDRS: NEGATIVE
THC: NORMAL

## 2022-08-22 PROCEDURE — 99214 OFFICE O/P EST MOD 30 MIN: CPT

## 2022-08-22 PROCEDURE — 82075 ASSAY OF BREATH ETHANOL: CPT

## 2022-08-22 PROCEDURE — 80305 DRUG TEST PRSMV DIR OPT OBS: CPT

## 2022-08-22 PROCEDURE — 73610 X-RAY EXAM OF ANKLE: CPT | Mod: TC,LT

## 2022-08-22 ASSESSMENT — FIBROSIS 4 INDEX: FIB4 SCORE: 0.22

## 2022-08-22 NOTE — LETTER
"EMPLOYEE’S CLAIM FOR COMPENSATION/ REPORT OF INITIAL TREATMENT  FORM C-4    EMPLOYEE’S CLAIM - PROVIDE ALL INFORMATION REQUESTED   First Name  Mariah Last Name  Sabina Birthdate                    2000                Sex  female Claim Number (Insurer’s Use Only)    Home Address  [address] Age  21 y.o. Height  1.651 m (5' 5\") Weight  66.2 kg (146 lb) [facility]     [facility] Zip  [address] Telephone  [phone] (home)    Mailing Address  [address] [facility] Zip  [address] Primary Language Spoken  English    Insurer   Third-Party   Workers Choice   Employee's Occupation (Job Title) When Injury or Occupational Disease Occurred      Employer's Name/Company Name  [employer]  Telephone  [phone]    Office Mail Address (Number and Street)   [address]  [address]    Date of Injury  8/21/2022               Hours Injury  4:00 PM Date Employer Notified  8/21/2022 Last Day of Work after Injury     or Occupational Disease  8/22/2022 Supervisor to Whom Injury     Reported  [place]   Address or Location of Accident (if applicable)  Work [1]   What were you doing at the time of accident? (if applicable)  Bring back dirty rags    How did this injury or occupational disease occur? (Be specific an answer in detail. Use additional sheet if necessary)  one of The tray carts went up and down on my ankle.   If you believe that you have an occupational disease, when did you first have knowledge of the disability and it relationship to your employment?  n/a Witnesses to the Accident  Fam (do not know last name)      Nature of Injury or Occupational Disease  Sprain  Part(s) of Body Injured or Affected  Ankle (L), N/A, N/A    I certify that the above is true and correct to the best of my knowledge and that I have provided this information in order " to obtain the benefits of [place]  I hereby authorize any physician, chiropractor, surgeon, practitioner, or other person, any hospital, including [facility] hospital, any medical service organization, any insurance company, or other institution or organization to release to each other, any medical or other information, including benefits paid or payable, pertinent to this injury or disease, except information relative to diagnosis, treatment and/or counseling for AIDS, psychological conditions, alcohol or controlled substances, for which I must give specific authorization.  A Photostat of this authorization shall be as valid as the original.     Date 8/22/2022   Place [facility] Employee’s Original or  *Electronic Signature   THIS REPORT MUST BE COMPLETED AND MAILED WITHIN 3 WORKING DAYS OF TREATMENT   [facility] [place]  Name of Facility  [facility]   Date  8/22/2022 Diagnosis and Description of Injury or Occupational Disease  (S99.912A) Injury of left ankle, initial encounter  (Z02.1) Pre-employment drug screening Is there evidence the injured employee was under the influence of alcohol and/or another controlled substance at the time of accident?  ? No ? Yes (if yes, please explain)    Hour  6:24 PM   Diagnoses of Injury of left ankle, initial encounter and Pre-employment drug screening were pertinent to this visit. No   Treatment  NSAIDS (ibuprofen as needed), ice, leg elevation  Have you advised the patient to remain off work five days or     more?    X-Ray Findings  Negative   ? Yes Indicate dates:   From   To      From information given by the employee, together with medical evidence, can        you directly connect this injury or occupational disease as job incurred?  Yes ? No If no, is the injured employee capable of:  ? full duty  No ? modified  "duty  Yes   Is additional medical care by a physician indicated?  Yes If Modified Duty, Specify any Limitations / Restrictions  Restrictions on walking, standing, climbing, and weight   Do you know of any previous injury or disease contributing to this condition or occupational disease?  ? Yes ? No (Explain if yes)                          No   Date  8/22/2022 Print Health Care Provider's   DONA Jay I certify the employer’s copy of  this form was mailed on:   Address  [address] [place] [address]. [address] Insurer’s Use Only     [facility] Zip  [address]    Provider’s Tax ID Number  [id number] Telephone  Dept: [phone]             Health Care Provider’s Original or Electronic Signature  e-MARIPOSA Acuña Degree (MD,DO, DC,PACieraC,APRN)   APRN      * Complete and attach Release of Information (Form C-4A) when injured employee signs C-4 Form electronically  ORIGINAL - TREATING HEALTHCARE PROVIDER PAGE 2 - INSURER/TPA PAGE 3 - EMPLOYER PAGE 4 - EMPLOYEE             Form C-4 (rev.08/21)           BRIEF DESCRIPTION OF RIGHTS AND BENEFITS  (Pursuant to [place] 616C.050)    Notice of Injury or Occupational Disease (Incident Report Form C-1): If an injury or occupational disease (OD) arises out of and in the course of employment, you must provide written notice to your employer as soon as practicable, but no later than 7 days after the accident or OD. Your employer shall maintain a sufficient supply of the required forms.    Claim for Compensation (Form C-4): If medical treatment is sought, the form C-4 is available at the place of initial treatment. A completed \"Claim for Compensation\" (Form C-4) must be filed within 90 days after an accident or OD. The treating physician or chiropractor must, within 3 working days after treatment, complete and mail to the employer, the employer's insurer and third-party , the Claim for Compensation.    Medical Treatment: If you require medical " treatment for your on-the-job injury or OD, you may be required to select a physician or chiropractor from a list provided by your workers’ compensation insurer, if it has contracted with an Organization for Managed Care (MCO) or Preferred Provider Organization (PPO) or providers of health care. If your employer has not entered into a contract with an MCO or PPO, you may select a physician or chiropractor from the Panel of Physicians and Chiropractors. Any medical costs related to your industrial injury or OD will be paid by your insurer.    Temporary Total Disability (TTD): If your doctor has certified that you are unable to work for a period of at least 5 consecutive days, or 5 cumulative days in a 20-day period, or places restrictions on you that your employer does not accommodate, you may be entitled to TTD compensation.    Temporary Partial Disability (TPD): If the wage you receive upon reemployment is less than the compensation for TTD to which you are entitled, the insurer may be required to pay you TPD compensation to make up the difference. TPD can only be paid for a maximum of 24 months.    Permanent Partial Disability (PPD): When your medical condition is stable and there is an indication of a PPD as a result of your injury or OD, within 30 days, your insurer must arrange for an evaluation by a rating physician or chiropractor to determine the degree of your PPD. The amount of your PPD award depends on the date of injury, the results of the PPD evaluation, your age and wage.    Permanent Total Disability (PTD): If you are medically certified by a treating physician or chiropractor as permanently and totally disabled and have been granted a PTD status by your insurer, you are entitled to receive monthly benefits not to exceed 66 2/3% of your average monthly wage. The amount of your PTD payments is subject to reduction if you previously received a lump-sum PPD award.    Vocational Rehabilitation Services:  You may be eligible for vocational rehabilitation services if you are unable to return to the job due to a permanent physical impairment or permanent restrictions as a result of your injury or occupational disease.    Transportation and Per Rhonda Reimbursement: You may be eligible for travel expenses and per rhonda associated with medical treatment.    Reopening: You may be able to reopen your claim if your condition worsens after claim closure.     Appeal Process: If you disagree with a written determination issued by the insurer or the insurer does not respond to your request, you may appeal to the Department of Administration, , by following the instructions contained in your determination letter. You must appeal the determination within 70 days from the date of the determination letter at [address], [address], [place], [place] [address], or [address], [address], [place], [place] [address]. If you disagree with the  decision, you may appeal to the Department of Administration, . You must file your appeal within 30 days from the date of the  decision letter at [address], [address], [place], [place] [address], or [address], [address], [place], [place] [address]. If you disagree with a decision of an , you may file a petition for judicial review with the District Court. You must do so within 30 days of the Appeal Officer’s decision. You may be represented by an  at your own expense or you may contact the [facility] for possible representation.    [place]  for Injured Workers (NAIW): If you disagree with a  decision, you may request that NAIW represent you without charge at an  Hearing. For information regarding denial of benefits, you may contact the [facility] at: [address], [address], [place] [address], [phone], or [address], [address],  [place] [address], [phone]    To File a Complaint with the Division: If you wish to file a complaint with the  of the Division of Industrial Relations (DIR),  please contact the Workers’ Compensation Section, [address], [address], [place], [place] [address], telephone [phone], or [address], [address], [place], [place] [address], telephone [phone].    For assistance with Workers’ Compensation Issues: You may contact the [facility] Office for Consumer Health Assistance, [address], [address], [place], [place] [address], Toll Free 1-206.813.4957, Web site: [email or web address] E-mail: [email or web address]              __________________________________________________________________                                    _________________            Employee Name / Signature                                                                                                                            Date                                                                                                                                                                                                                              D-2 (rev. 10/20)

## 2022-08-22 NOTE — LETTER
"   [facility] [place]  [address] - [place] [address]  Phone:  [phone] - Fax:  [phone]   [facility] Progress Report and Disability Certification  Date of Service: 8/22/2022   No Show:  No  Date / Time of Next Visit: 8/26/2022   Claim Information   Patient Name: Mariah Muhammad  Claim Number:     Employer: [employer]  Date of Injury: 8/21/2022     Insurer / TPA: Workers Choice  ID / SSN:     Occupation:   Diagnosis: Diagnoses of Injury of left ankle, initial encounter and Pre-employment drug screening were pertinent to this visit.    Medical Information   Related to Industrial Injury? Yes    Subjective Complaints:  DOI: 8/21/22  CASSI: Patient works as a  at [employer]. She reports that she was picked up her trays from [employer] and were sitting on the cart. She was holding 2 carts on the right hand and one on the left hand, pushing these when the trays slid down from one of the carts and landed on her left foot. She reports pain on her left ankle, sharp, 7/10, worse with walking and weight bearing. She has taken ibuprofen and Tylenol, with some relief. She reports rest and siting helps with pain relief. She denies any numbness, tingling or weakness on the left foot. She did sustain a laceration on her left heel. She is UTD on her TDAP.    Objective Findings:   /68 (BP Location: Right arm, Patient Position: Sitting, BP Cuff Size: Adult)   Pulse 92   Temp 36.8 °C (98.2 °F) (Temporal)   Resp 18   Ht 1.651 m (5' 5\")   Wt 66.2 kg (146 lb)   SpO2 98%   BMI 24.30 kg/m²      Physical Exam  Constitutional:       Appearance: Normal appearance.   HENT:      Head: Normocephalic.      Nose: Nose normal.   Eyes:      Extraocular Movements: Extraocular movements intact.   Cardiovascular:      Rate and Rhythm: Normal rate.      Pulses: Normal pulses.   Pulmonary:      Effort: Pulmonary effort is normal. "   Musculoskeletal:      Cervical back: Normal range of motion.      Left ankle: Swelling and laceration present. No deformity. Tenderness present. Decreased range of motion. Normal pulse.        Legs:     Feet:      Comments: Laceration noted on the left heel.  Skin:     General: Skin is warm and dry.   Neurological:      General: No focal deficit present.      Mental Status: She is alert.   Psychiatric:         Mood and Affect: Mood normal.         Behavior: Behavior normal.         Judgment: Judgment normal.          Pre-Existing Condition(s): None    Assessment:   Initial Visit    Status: Additional Care Required  Permanent Disability:No    Plan:      Diagnostics: X-ray    Comments:       Disability Information   Status: Released to Restricted Duty    From:  8/22/2022  Through: 8/26/2022 Restrictions are: Temporary   Physical Restrictions   Sitting:    Standing:  < or = to 1 hr/day Stooping:    Bending:      Squatting:    Walking:  < or = to 1 hr/day Climbing:  < or = to 1 hr/day Pushing:      Pulling:    Other:    Reaching Above Shoulder (L):   Reaching Above Shoulder (R):       Reaching Below Shoulder (L):    Reaching Below Shoulder (R):      Not to exceed Weight Limits   Carrying(hrs):   Weight Limit(lb): < or = to 25 pounds Lifting(hrs):   Weight  Limit(lb): < or = to 25 pounds   Comments: Recommended RICE (rest, ice, compression, elevation).  May take ibuprofen up to 800 mg every 8 hours as needed for pain relief.  Advised to apply ice or heat to the area.  May apply topical analgesics or patches for additional pain relief. Discussed current restrictions, patient agreeable and verbalized understanding.  Advised to return to clinic in 5 to 7 days, sooner if needed.      Repetitive Actions   Hands: i.e. Fine Manipulations from Grasping:     Feet: i.e. Operating Foot Controls:     Driving / Operate Machinery:     Health Care Provider’s Original or Electronic Signature  Dominique Spence A.P.R[facility]  Provider’s Original or Electronic Signature    Jarod Tabares MD         Clinic Name / Location: [address]  [place] [address] Clinic Phone Number: Dept: [phone]   Appointment Time: 6:05 Pm Visit Start Time: 6:24 PM   Check-In Time:  6:06 Pm Visit Discharge Time:  7:33PM   Original-Treating Physician or Chiropractor    Page 2-Insurer/TPA    Page 3-Employer    Page 4-Employee

## 2022-08-22 NOTE — LETTER
"   [facility] [place]  [address] - [place] [address]  Phone:  [phone] - Fax:  [phone]   Occ[facility] Progress Report and Disability Certification  Date of Service: 8/22/2022   No Show:  No  Date / Time of Next Visit: 8/26/2022   Claim Information   Patient Name: Mariah Muhammad  Claim Number:     Employer: [employer]  Date of Injury: 8/21/2022     Insurer / TPA: Workers Choice  ID / SSN:     Occupation:   Diagnosis: Diagnoses of Injury of left ankle, initial encounter and Pre-employment drug screening were pertinent to this visit.    Medical Information   Related to Industrial Injury? Yes    Subjective Complaints:  DOI: 8/21/22  CASSI: Patient works as a  at [employer]. She reports that she was picked up her trays from [employer] and were sitting on the cart. She was holding 2 carts on the right hand and one on the left hand, pushing these when the trays slid down from one of the carts and landed on her left foot. She reports pain on her left ankle, sharp, 7/10, worse with walking and weight bearing. She has taken ibuprofen and Tylenol, with some relief. She reports rest and siting helps with pain relief. She denies any numbness, tingling or weakness on the left foot. She did sustain a laceration on her left heel. She is UTD on her TDAP.    Objective Findings:   /68 (BP Location: Right arm, Patient Position: Sitting, BP Cuff Size: Adult)   Pulse 92   Temp 36.8 °C (98.2 °F) (Temporal)   Resp 18   Ht 1.651 m (5' 5\")   Wt 66.2 kg (146 lb)   SpO2 98%   BMI 24.30 kg/m²      Physical Exam  Constitutional:       Appearance: Normal appearance.   HENT:      Head: Normocephalic.      Nose: Nose normal.   Eyes:      Extraocular Movements: Extraocular movements intact.   Cardiovascular:      Rate and Rhythm: Normal rate.      Pulses: Normal pulses.   Pulmonary:      Effort: Pulmonary effort is normal. "   Musculoskeletal:      Cervical back: Normal range of motion.      Left ankle: Swelling and laceration present. No deformity. Tenderness present. Decreased range of motion. Normal pulse.        Legs:     Feet:      Comments: Laceration noted on the left heel.  Skin:     General: Skin is warm and dry.   Neurological:      General: No focal deficit present.      Mental Status: She is alert.   Psychiatric:         Mood and Affect: Mood normal.         Behavior: Behavior normal.         Judgment: Judgment normal.          Pre-Existing Condition(s): None    Assessment:   Initial Visit    Status: Additional Care Required  Permanent Disability:No    Plan:      Diagnostics: X-ray    Comments:       Disability Information   Status: Released to Restricted Duty    From:  8/22/2022  Through: 8/26/2022 Restrictions are: Temporary   Physical Restrictions   Sitting:    Standing:  < or = to 1 hr/day Stooping:    Bending:      Squatting:    Walking:  < or = to 1 hr/day Climbing:  < or = to 1 hr/day Pushing:      Pulling:    Other:    Reaching Above Shoulder (L):   Reaching Above Shoulder (R):       Reaching Below Shoulder (L):    Reaching Below Shoulder (R):      Not to exceed Weight Limits   Carrying(hrs):   Weight Limit(lb): < or = to 25 pounds Lifting(hrs):   Weight  Limit(lb): < or = to 25 pounds   Comments: Recommended RICE (rest, ice, compression, elevation).  May take ibuprofen up to 800 mg every 8 hours as needed for pain relief.  Advised to apply ice or heat to the area.  May apply topical analgesics or patches for additional pain relief.   May wear ankle stabilizer at work as needed.    Advised to return to clinic in 5 to 7 days, sooner if needed.      Repetitive Actions   Hands: i.e. Fine Manipulations from Grasping:     Feet: i.e. Operating Foot Controls:     Driving / Operate Machinery:     Health Care Provider’s Original or Electronic Signature  DONA Jay Health Care Provider’s Original or Electronic  Signature    Jarod Tabares MD         Clinic Name / Location: [address]  [place] [address] Clinic Phone Number: Dept: [phone]   Appointment Time: 6:05 Pm Visit Start Time: 6:24 PM   Check-In Time:  6:06 Pm Visit Discharge Time:  7:33PM   Original-Treating Physician or Chiropractor    Page 2-Insurer/TPA    Page 3-Employer    Page 4-Employee

## 2022-08-23 ENCOUNTER — TELEPHONE (OUTPATIENT)
Dept: URGENT CARE | Facility: PHYSICIAN GROUP | Age: 22
End: 2022-08-23
Payer: COMMERCIAL

## 2022-08-23 NOTE — PROGRESS NOTES
"Subjective     Mariah Muhammad is a 21 y.o. female who presents with Work-Related Injury (Food cart tray ran over left ankle, happened today ) and Other (Drug and alcohol screening )      DOI: 8/21/22  CASSI: Patient works as a  at [employer]. She reports that she was picked up her trays from [employer] and were sitting on the cart. She was holding 2 carts on the right hand and one on the left hand, pushing these when the trays slid down from one of the carts and landed on her left foot. She reports pain on her left ankle, sharp, 7/10, worse with walking and weight bearing. She has taken ibuprofen and Tylenol, with some relief. She reports rest and siting helps with pain relief. She denies any numbness, tingling or weakness on the left foot. She did sustain a laceration on her left heel. She is UTD on her TDAP.      HPI-see above    Review of Systems   Musculoskeletal:         Left ankle pain   All other systems reviewed and are negative.           Objective     /68 (BP Location: Right arm, Patient Position: Sitting, BP Cuff Size: Adult)   Pulse 92   Temp 36.8 °C (98.2 °F) (Temporal)   Resp 18   Ht 1.651 m (5' 5\")   Wt 66.2 kg (146 lb)   SpO2 98%   BMI 24.30 kg/m²      Physical Exam  Constitutional:       Appearance: Normal appearance.   HENT:      Head: Normocephalic.      Nose: Nose normal.   Eyes:      Extraocular Movements: Extraocular movements intact.   Cardiovascular:      Rate and Rhythm: Normal rate.      Pulses: Normal pulses.   Pulmonary:      Effort: Pulmonary effort is normal.   Musculoskeletal:      Cervical back: Normal range of motion.      Left ankle: Swelling and laceration present. No deformity. Tenderness present. Decreased range of motion. Normal pulse.        Legs:    Feet:      Comments: Laceration noted on the left heel.  Skin:     General: Skin is warm and dry.   Neurological:      General: No focal deficit present.      Mental Status: She is " alert.   Psychiatric:         Mood and Affect: Mood normal.         Behavior: Behavior normal.         Judgment: Judgment normal.     Left ankle x-ray    FINDINGS:     BONE MINERALIZATION: Normal.  JOINTS: Preserved. No erosions.  FRACTURE: None.  DISLOCATION: None.  SOFT TISSUES: No mass.     IMPRESSION:     No acute osseous abnormality.              Assessment & Plan          1. Injury of left ankle, initial encounter    - DX-ANKLE 3+ VIEWS LEFT; Future  - POCT Breath Alcohol Test  - POCT 11 Panel Urine Drug Screen    2. Ankle strain, left, initial encounter      3. Pre-employment drug screening      Discussed x-ray results with patient, which did not show any fracture or dislocation.  Recommended RICE (rest, ice, compression, elevation).  May take ibuprofen up to 800 mg every 8 hours as needed for pain relief.  Advised to apply ice or heat to the area.  May apply topical analgesics or patches for additional pain relief.  May wear ankle brace/splint at work as needed.  Discussed treatment plan with patient, she is agreeable and verbalized understanding.  Educated patient on signs and symptoms watch out for, when to return to clinic or go to the ER.    D 39 and C4 paperwork completed.    Electronically Signed by GEORGE Klein

## 2022-08-23 NOTE — TELEPHONE ENCOUNTER
Pt came in asking if you could please call her so she can get the results of her Xray.  She also would like to know if you could change your note on the D39 to say the cart landed on her foot.  The current way she says makes it sound like it was a tray that landed on her foot.  Thanks

## 2022-08-25 ENCOUNTER — OCCUPATIONAL MEDICINE (OUTPATIENT)
Dept: URGENT CARE | Facility: PHYSICIAN GROUP | Age: 22
End: 2022-08-25
Payer: COMMERCIAL

## 2022-08-25 VITALS
HEART RATE: 95 BPM | TEMPERATURE: 98.2 F | OXYGEN SATURATION: 96 % | HEIGHT: 65 IN | BODY MASS INDEX: 24.32 KG/M2 | RESPIRATION RATE: 20 BRPM | WEIGHT: 146 LBS | SYSTOLIC BLOOD PRESSURE: 128 MMHG | DIASTOLIC BLOOD PRESSURE: 64 MMHG

## 2022-08-25 DIAGNOSIS — S96.912D ANKLE STRAIN, LEFT, SUBSEQUENT ENCOUNTER: ICD-10-CM

## 2022-08-25 DIAGNOSIS — S99.912D INJURY OF LEFT ANKLE, SUBSEQUENT ENCOUNTER: ICD-10-CM

## 2022-08-25 PROCEDURE — 99213 OFFICE O/P EST LOW 20 MIN: CPT | Performed by: NURSE PRACTITIONER

## 2022-08-25 ASSESSMENT — FIBROSIS 4 INDEX: FIB4 SCORE: 0.22

## 2022-08-25 NOTE — PROGRESS NOTES
"Subjective:     Mariah Muhammad is a 21 y.o. female who presents for Follow-Up (Left ankle )      Copied from prior visit:   \" DOI: 8/21/22  CASSI: Patient works as a  at [employer]. She reports that she was picked up her trays from [employer] and were sitting on the cart. She was holding 2 carts on the right hand and one on the left hand, pushing these when the trays slid down from one of the carts and landed on her left foot.     Second visit: 8/25/22 Patient reports standing for one hour causes pain 7/10 lateral ankle. Pain has not improved since injury with standing.  Pt reports pain improves with sitting.  Denies any numbness tingling.  Patient has been wearing the brace at work.  Patient has been using rest, ice ,elevation for pain.  Tylenol and ibuprofen is somewhat helpful.      PMH:   No pertinent past medical history to this problem  MEDS:  Medications were reviewed in EMR  ALLERGIES:  Allergies were reviewed in EMR  SOCHX:  Works as a food worker  FH:   No pertinent family history to this problem       Objective:     /64 (BP Location: Right arm, Patient Position: Sitting, BP Cuff Size: Adult)   Pulse 95   Temp 36.8 °C (98.2 °F) (Temporal)   Resp 20   Ht 1.651 m (5' 5\")   Wt 66.2 kg (146 lb)   SpO2 96%   BMI 24.30 kg/m²      Presents to clinic in ankle brace.TTP lateral malleoli. no swelling or ecchymosis noted.  Distal neurovascular status is grossly intact.  Range of motion is mildly decreased due to pain.  Patient walks with a limp, favoring the right ankle.    Assessment/Plan:       1. Injury of left ankle, subsequent encounter    2. Ankle strain, left, subsequent encounter    Released to Restricted Duty FROM 8/25/2022 TO 8/29/2022 return on 8/29/22  Decreasing restrictions due to no improvement in pain. Patient is to not stand more than 2 hours per day. Patient is to sit and elevate foot when possible. Please make reasonable workplace accomodation that " pls see notes from today's phone calls patient can do sitting.        Differential diagnosis, natural history, supportive care, and indications for immediate follow-up discussed.     LAI Virgen.

## 2022-08-25 NOTE — LETTER
[facility]  [address] - [place] [address]  Phone:  [phone] - Fax:  [phone]   [facility] Progress Report and Disability Certification  Date of Service: 8/25/2022   No Show:  No  Date / Time of Next Visit: 8/29/2022   Claim Information   Patient Name: Mariah Muhammad  Claim Number:     Employer: [employer]  Date of Injury: 8/21/2022     Insurer / TPA: Workers Choice  ID / SSN:     Occupation:   Diagnosis: Diagnoses of Injury of left ankle, subsequent encounter and Ankle strain, left, subsequent encounter were pertinent to this visit.    Medical Information   Related to Industrial Injury? Yes    Subjective Complaints:  Patient reports standing for one hour causes pain 7/10 lateral ankle. Pain has not improved since injury with standing.  Pt reports pain improves with sitting.  Denies any numbness tingling.  Patient has been wearing the brace at work.  Patient has been using rest ice elevation for pain.     Objective Findings:  Presents to clinic in ankle brace.TTP lateral malleoli. no swelling or ecchymosis noted.  Distal neurovascular status is grossly intact.  Range of motion is mildly decreased due to pain.  Patient walks with a limp favoring the right ankle.   Pre-Existing Condition(s):     Assessment:   Condition Same    Status: Additional Care Required  Permanent Disability:No    Plan:      Diagnostics:      Comments:       Disability Information   Status: Released to Restricted Duty    From:  8/25/2022  Through: 8/29/2022 Restrictions are: Temporary   Physical Restrictions   Sitting:    Standing:  < or = to 2 hrs/day Stooping:    Bending:      Squatting:    Walking:    Climbing:    Pushing:      Pulling:    Other:    Reaching Above Shoulder (L):   Reaching Above Shoulder (R):       Reaching Below Shoulder (L):    Reaching Below Shoulder (R):      Not to exceed Weight Limits   Carrying(hrs):   Weight Limit(lb):    Lifting(hrs):   Weight  Limit(lb):     Comments: Decreasing restrictions due to no improvement in pain. Patient is to not stand more than 2 hours per day. Patient is to sit and elevate foot when possible. Please make reasonable workplace accomodation that patient can do sitting.     Repetitive Actions   Hands: i.e. Fine Manipulations from Grasping:     Feet: i.e. Operating Foot Controls:     Driving / Operate Machinery:     Health Care Provider’s Original or Electronic Signature  LAI Virgen. Health Care Provider’s Original or Electronic Signature    Jarod Tabares MD         Clinic Name / Location: [address]  [place] [address] Clinic Phone Number: Dept: [phone]   Appointment Time: 3:05 Pm Visit Start Time: 3:29 PM   Check-In Time:  3:23 Pm Visit Discharge Time:  4:14pm   Original-Treating Physician or Chiropractor    Page 2-Insurer/TPA    Page 3-Employer    Page 4-Employee

## 2022-08-29 ENCOUNTER — OCCUPATIONAL MEDICINE (OUTPATIENT)
Dept: URGENT CARE | Facility: CLINIC | Age: 22
End: 2022-08-29
Payer: COMMERCIAL

## 2022-08-29 VITALS
TEMPERATURE: 98.2 F | DIASTOLIC BLOOD PRESSURE: 72 MMHG | RESPIRATION RATE: 16 BRPM | OXYGEN SATURATION: 99 % | HEART RATE: 74 BPM | HEIGHT: 65 IN | BODY MASS INDEX: 24.32 KG/M2 | SYSTOLIC BLOOD PRESSURE: 118 MMHG | WEIGHT: 146 LBS

## 2022-08-29 DIAGNOSIS — S90.02XD CONTUSION OF LEFT ANKLE, SUBSEQUENT ENCOUNTER: ICD-10-CM

## 2022-08-29 DIAGNOSIS — S96.912D ANKLE STRAIN, LEFT, SUBSEQUENT ENCOUNTER: ICD-10-CM

## 2022-08-29 PROCEDURE — 99213 OFFICE O/P EST LOW 20 MIN: CPT | Performed by: NURSE PRACTITIONER

## 2022-08-29 ASSESSMENT — ENCOUNTER SYMPTOMS
CONSTITUTIONAL NEGATIVE: 1
NEUROLOGICAL NEGATIVE: 1

## 2022-08-29 ASSESSMENT — VISUAL ACUITY: OU: 1

## 2022-08-29 ASSESSMENT — FIBROSIS 4 INDEX: FIB4 SCORE: 0.22

## 2022-08-29 NOTE — LETTER
"   [facility]  [address] [place] [address]  Phone:  [phone] - Fax:  [phone]   [facility] Progress Report and Disability Certification  Date of Service: 8/29/2022   No Show:  No  Date / Time of Next Visit: Occupational Health   Claim Information   Patient Name: Mariah Muhammad  Claim Number:     Employer: [employer]  Date of Injury: 8/21/2022     Insurer / TPA: Workers Choice  ID / SSN:     Occupation:   Diagnosis: Diagnoses of Ankle strain, left, subsequent encounter and Contusion of left ankle, subsequent encounter were pertinent to this visit.    Medical Information   Related to Industrial Injury? Yes    Subjective Complaints:  Initial UC visit 8/22/2022 reviewed for continuity of care:    \"DOI: 8/21/22  CASSI: Patient works as a  at [employer]. She reports that she was picked up her trays from [employer] and were sitting on the cart. She was holding 2 carts on the right hand and one on the left hand, pushing these when the trays slid down from one of the carts and landed on her left foot. She reports pain on her left ankle, sharp, 7/10, worse with walking and weight bearing. She has taken ibuprofen and Tylenol, with some relief. She reports rest and siting helps with pain relief. She denies any numbness, tingling or weakness on the left foot. She did sustain a laceration on her left heel. She is UTD on her TDAP.\"    X-ray of left ankle normal. Dx: left ankle strain. Tx: RICE, ibuprofen. Followed up 8/25/2022. Noted worsening pain with standing so restrictions increased.    Follow-up UC visit today 8/29/2022:    Patient reports symptoms unchanged.  Continues to have pain and tenderness at the lateral and anterior aspect of her left ankle.  Worsens with palpation, weightbearing on the heel, and with range of motion.  Wearing the ankle brace.  Reports walking on her her toes because it is painful to bear " weight on the heel.  However, reporting some pain at her Achilles tendon now.    To clarify her initial injury, patient reports that she was pushing a cart.  A coworker of hers was pushing another cart near her when her coworker's cart struck her cart causing her cart to jump up and then down striking her left ankle.   Objective Findings: Constitutional:       General: She is not in acute distress.     Appearance: She is well-developed. She is not ill-appearing or toxic-appearing.   Musculoskeletal:         General: No deformity.      Left ankle: No swelling, deformity, ecchymosis or lacerations. Tenderness (Lateral malleolus, anterior) present. Decreased range of motion. Anterior drawer test negative.      Left Achilles Tendon: Tenderness (Mild, intact) present. No defects.   Skin:     Coloration: Skin is not pale.      Findings: Rash (Well-healing laceration behind left heel) present. No bruising or erythema.   Neurological:      Mental Status: She is alert and oriented to person, place, and time.      Motor: No weakness.   Pre-Existing Condition(s):     Assessment:   Condition Same    Status: Discharged / Care Transfer  Permanent Disability:No    Plan: Transfer Care    Diagnostics:      Comments:  Symptoms unchanged. 3rd visit. Transfer care to occupational medicine. Follow up within 1 week. Continue with ankle brace, RICE, and ibuprofen/acetaminophen. Crutches provided to reduce weight bearing on left foot/ankle. Continue restrictions. Seek immediate medical attention if symptoms change/worsen.    Disability Information   Status: Released to Restricted Duty    From:  8/29/2022  Through: 9/5/2022 Restrictions are: Temporary   Physical Restrictions   Sitting:    Standing:  < or = to 2 hrs/day Stooping:    Bending:      Squatting:    Walking:    Climbing:    Pushing:      Pulling:    Other:    Reaching Above Shoulder (L):   Reaching Above Shoulder (R):       Reaching Below Shoulder (L):    Reaching Below Shoulder  (R):      Not to exceed Weight Limits   Carrying(hrs):   Weight Limit(lb):   Lifting(hrs):   Weight  Limit(lb):     Comments:      Repetitive Actions   Hands: i.e. Fine Manipulations from Grasping:     Feet: i.e. Operating Foot Controls:     Driving / Operate Machinery:     Health Care Provider’s Original or Electronic Signature  DONA Harris Health Care Provider’s Original or Electronic Signature    Jarod Tabares MD         Clinic Name / Location: [address] [place] [address] Clinic Phone Number: Dept: [phone]   Appointment Time: 3:00 Pm Visit Start Time: 5:07 PM   Check-In Time:  2:59 Pm Visit Discharge Time:     Original-Treating Physician or Chiropractor    Page 2-Insurer/TPA    Page 3-Employer    Page 4-Employee

## 2022-08-30 NOTE — PROGRESS NOTES
"Subjective:     Mariah Muhammad is a 21 y.o. female who presents for Follow-Up (WC FU L ankle injury)    Initial UC visit 8/22/2022 reviewed for continuity of care:    \"DOI: 8/21/22  CASSI: Patient works as a  at [employer]. She reports that she was picked up her trays from [employer] and were sitting on the cart. She was holding 2 carts on the right hand and one on the left hand, pushing these when the trays slid down from one of the carts and landed on her left foot. She reports pain on her left ankle, sharp, 7/10, worse with walking and weight bearing. She has taken ibuprofen and Tylenol, with some relief. She reports rest and siting helps with pain relief. She denies any numbness, tingling or weakness on the left foot. She did sustain a laceration on her left heel. She is UTD on her TDAP.\"    X-ray of left ankle normal. Dx: left ankle strain. Tx: RICE, ibuprofen. Followed up 8/25/2022. Noted worsening pain with standing so restrictions increased.    Follow-up UC visit today 8/29/2022:    Patient reports symptoms unchanged.  Continues to have pain and tenderness at the lateral and anterior aspect of her left ankle.  Worsens with palpation, weightbearing on the heel, and with range of motion.  Wearing the ankle brace.  Reports walking on her her toes because it is painful to bear weight on the heel.  However, reporting some pain at her Achilles tendon now.    To clarify her initial injury, patient reports that she was pushing a cart.  A coworker of hers was pushing another cart near her when her coworker's cart struck her cart causing her cart to jump up and then down striking her left ankle.    Review of Systems   Constitutional: Negative.    Musculoskeletal:         L ankle pain   Neurological: Negative.    All other systems reviewed and are negative.    During this visit, appropriate PPE was worn, hand hygiene was performed, and the patient and any visitors were masked.    PMH: " "No pertinent past medical history to this problem  MEDS: Medications were reviewed in Epic  ALLERGIES: Allergies were reviewed in Epic  SOCHX: Works as a  at [employer]   FH: No pertinent family history to this problem      Objective:     /72 (BP Location: Left arm, Patient Position: Sitting, BP Cuff Size: Adult long)   Pulse 74   Temp 36.8 °C (98.2 °F) (Temporal)   Resp 16   Ht 1.651 m (5' 5\")   Wt 66.2 kg (146 lb)   SpO2 99%   BMI 24.30 kg/m²     Physical Exam  Nursing note reviewed.   Constitutional:       General: She is not in acute distress.     Appearance: She is well-developed. She is not ill-appearing or toxic-appearing.   Eyes:      General: Vision grossly intact.   Cardiovascular:      Rate and Rhythm: Normal rate.      Pulses: Normal pulses.   Pulmonary:      Effort: Pulmonary effort is normal. No respiratory distress.   Musculoskeletal:         General: No deformity.      Left ankle: No swelling, deformity, ecchymosis or lacerations. Tenderness (Lateral malleolus, anterior) present. Decreased range of motion. Anterior drawer test negative.      Left Achilles Tendon: Tenderness (Mild, intact) present. No defects.   Skin:     Coloration: Skin is not pale.      Findings: Rash (Well-healing laceration behind left heel) present. No bruising or erythema.   Neurological:      Mental Status: She is alert and oriented to person, place, and time.      Motor: No weakness.   Psychiatric:         Behavior: Behavior normal. Behavior is cooperative.       Assessment/Plan:     1. Ankle strain, left, subsequent encounter  - Referral to Occupational Medicine    2. Contusion of left ankle, subsequent encounter  - Referral to Occupational Medicine    Symptoms unchanged. 3rd visit. Transfer care to occupational medicine. Follow up within 1 week. Continue with ankle brace, RICE, and ibuprofen/acetaminophen. Crutches provided to reduce weight bearing on left foot/ankle. Continue restrictions. Seek " immediate medical attention if symptoms change/worsen.    Differential diagnosis, natural history, supportive care, over-the-counter symptom management per 's instructions, close monitoring, and indications for immediate follow-up discussed.     All questions answered. Patient agrees with the plan of care.

## 2022-09-14 ENCOUNTER — OCCUPATIONAL MEDICINE (OUTPATIENT)
Dept: OCCUPATIONAL MEDICINE | Facility: CLINIC | Age: 22
End: 2022-09-14
Payer: COMMERCIAL

## 2022-09-14 VITALS
SYSTOLIC BLOOD PRESSURE: 132 MMHG | HEART RATE: 80 BPM | DIASTOLIC BLOOD PRESSURE: 84 MMHG | HEIGHT: 65 IN | TEMPERATURE: 98.9 F | OXYGEN SATURATION: 100 % | RESPIRATION RATE: 16 BRPM | BODY MASS INDEX: 24.83 KG/M2 | WEIGHT: 149 LBS

## 2022-09-14 DIAGNOSIS — S96.912D STRAIN OF LEFT ANKLE, SUBSEQUENT ENCOUNTER: ICD-10-CM

## 2022-09-14 PROCEDURE — 99213 OFFICE O/P EST LOW 20 MIN: CPT | Performed by: NURSE PRACTITIONER

## 2022-09-14 ASSESSMENT — FIBROSIS 4 INDEX: FIB4 SCORE: 0.22

## 2022-09-14 NOTE — PROGRESS NOTES
"Subjective:     Mariah Muhammad is a 21 y.o. female who presents for Follow-Up (DOI 8/21/22 - L Ankle - [facility])      DOI 8/21/22: CASSI:  A coworker was pushing another cart near her when her coworker's cart struck her cart causing her cart to jump up and then down striking her left ankle.Patient seen in Urgent care x 3 for this injury. Diagnostic imaging negative for acute findings. Today patient states that she continues to have discomfort with walking and has been compensating.  She states that flexing the foot hurts and she has pain at the achilles tendon. No numbness or tingling. She states that she was wearing a ankle splint, but it was leaving dents and abrasions on the skin so she stopped wearing it. She takes Ibuprofen every 6 hours. She has not been back to work. She is requesting physical therapy. Plan of care discussed with patient.     ROS: All systems were reviewed on intake form, form was reviewed and signed. See scanned documents in media. Pertinent positives and negatives included in HPI.    PMH: No pertinent past medical history to this problem  MEDS: Medications were reviewed in Epic  ALLERGIES: No Known Allergies  SOCHX: Works as   at [employer] [facility] [employer]  FH: No pertinent family history to this problem       Objective:     /84 (BP Location: Right arm, Patient Position: Sitting, BP Cuff Size: Adult)   Pulse 80   Temp 37.2 °C (98.9 °F) (Temporal)   Resp 16   Ht 1.651 m (5' 5\")   Wt 67.6 kg (149 lb)   SpO2 100%   BMI 24.79 kg/m²     [unfilled]    Left ankle: No gross deformity or discolorations noted. Limited ROM, secondary to discomfort. Distal strength and sensation intact. Neg Talar tilt. Discomfort with flexion and extension. Distal pulses intact. Mild TTP to the achilles tendon, but it remains intact. No visible or palpable edema noted.     Assessment/Plan:       1. Strain of left ankle, subsequent encounter  - Referral to Physical Therapy    Released to Restricted Duty FROM " 9/14/2022 TO 9/21/2022  Recommend rotating standing and walking  Follow-up in 1 week  Restricted duty  Physical therapy referral placed  Recommend continue with OTC Tylenol/ibuprofen, ice, elevation, and gentle range of motion stretching exercises as demonstrated  Recommend increasing weightbearing activities as tolerated  Recommend using ankle splint while walking, with periodic breaks as needed for stretching and comfort    Differential diagnosis, natural history, supportive care, and indications for immediate follow-up discussed.    Approximately 25 minutes were spent in reviewing notes, preparing for visit, obtaining history, exam and evaluation, patient counseling/education and post visit documentation/orders.

## 2022-09-14 NOTE — LETTER
[facility]  [address]   [address] [place] [address]  Phone:  [phone] - Fax:  [phone]   Occupational Health [facility] Progress Report and Disability Certification  Date of Service: 9/14/2022   No Show:  No  Date / Time of Next Visit: 9/21/2022   Claim Information   Patient Name: Mariah Muhammad  Claim Number:     Employer: [employer]  Date of Injury: 8/21/2022     Insurer / TPA: Workers Choice  ID / SSN:     Occupation:   Diagnosis: The encounter diagnosis was Strain of left ankle, subsequent encounter.    Medical Information   Related to Industrial Injury? Yes    Subjective Complaints:  DOI 8/21/22: CASSI:  A coworker was pushing another cart near her when her coworker's cart struck her cart causing her cart to jump up and then down striking her left ankle.Patient seen in Urgent care x 3 for this injury. Diagnostic imaging negative for acute findings. Today patient states that she continues to have discomfort with walking and has been compensating.  She states that flexing the foot hurts and she has pain at the achilles tendon. No numbness or tingling. She states that she was wearing a ankle splint, but it was leaving dents and abrasions on the skin so she stopped wearing it. She takes Ibuprofen every 6 hours. She has not been back to work. She is requesting physical therapy. Plan of care discussed with patient.    Objective Findings: Left ankle: No gross deformity or discolorations noted. Limited ROM, secondary to discomfort. Distal strength and sensation intact. Neg Talar tilt. Discomfort with flexion and extension. Distal pulses intact. Mild TTP to the achilles tendon, but it remains intact. No visible or palpable edema noted.    Pre-Existing Condition(s):     Assessment:   Condition Same    Status: Additional Care Required  Permanent Disability:No    Plan: PT    Diagnostics:      Comments:  Follow-up in 1 week  Restricted duty  Physical  therapy referral placed  Recommend continue with OTC Tylenol/ibuprofen, ice, elevation, and gentle range of motion stretching exercises as demonstrated  Recommend increasing weightbearing activities as tolerated  Recommend using ankle splint while walking, with periodic breaks as needed for stretching and comfort    Disability Information   Status: Released to Restricted Duty    From:  2022  Through: 2022 Restrictions are: Temporary   Physical Restrictions   Sitting:    Standing:  < or = to 2 hrs/day Stooping:    Bending:      Squatting:    Walking:  < or = to 2 hrs/day Climbin hrs/day Pushing:      Pulling:    Other:    Reaching Above Shoulder (L):   Reaching Above Shoulder (R):       Reaching Below Shoulder (L):    Reaching Below Shoulder (R):      Not to exceed Weight Limits   Carrying(hrs):   Weight Limit(lb):   Lifting(hrs):   Weight  Limit(lb):     Comments: Recommend rotating standing and walking    Repetitive Actions   Hands: i.e. Fine Manipulations from Grasping:     Feet: i.e. Operating Foot Controls:     Driving / Operate Machinery:     Health Care Provider’s Original or Electronic Signature  DONA Miller Health Care Provider’s Original or Electronic Signature    Jarod Tabares MD         Clinic Name / Location: [address],   [address] [place] [address] Clinic Phone Number: Dept: [phone]   Appointment Time: 1:00 Pm Visit Start Time: 12:57 PM   Check-In Time:  12:29 Pm Visit Discharge Time:  144pm   Original-Treating Physician or Chiropractor    Page 2-Insurer/TPA    Page 3-Employer    Page 4-Employee

## 2022-09-20 ENCOUNTER — OCCUPATIONAL MEDICINE (OUTPATIENT)
Dept: OCCUPATIONAL MEDICINE | Facility: CLINIC | Age: 22
End: 2022-09-20
Payer: COMMERCIAL

## 2022-09-20 DIAGNOSIS — S96.912D STRAIN OF LEFT ANKLE, SUBSEQUENT ENCOUNTER: ICD-10-CM

## 2022-09-20 PROCEDURE — 99213 OFFICE O/P EST LOW 20 MIN: CPT | Performed by: NURSE PRACTITIONER

## 2022-09-20 ASSESSMENT — ENCOUNTER SYMPTOMS
WEAKNESS: 0
CARDIOVASCULAR NEGATIVE: 1
PSYCHIATRIC NEGATIVE: 1
TINGLING: 0
RESPIRATORY NEGATIVE: 1
CONSTITUTIONAL NEGATIVE: 1
MYALGIAS: 1
SENSORY CHANGE: 0

## 2022-09-20 NOTE — LETTER
[address],   [address] [address]  Phone:  [phone] - Fax:  [phone]   Occupational Health [facility] Progress Report and Disability Certification  Date of Service: 9/20/2022   No Show:  No  Date / Time of Next Visit: 10/4/2022@   Claim Information   Patient Name: Mariah Muhammad  Claim Number:     Employer: [employer]  Date of Injury: 8/21/2022     Insurer / TPA: Workers Choice  ID / SSN:     Occupation:   Diagnosis: The encounter diagnosis was Strain of left ankle, subsequent encounter.    Medical Information   Related to Industrial Injury? Yes    Subjective Complaints:  DOI 8/21/22: CASSI:  A coworker was pushing another cart near her when her coworker's cart struck her cart causing her cart to jump up and then down striking her left ankle. Today patient states that she continues to have discomfort with walking. She is trying to walk without compensating.    No numbness or tingling. She states that she was wearing a ankle splint. Continued use of Ibuprofen. She has not been back to work. She is starting physical therapy. Plan of care discussed with patient.    Objective Findings: Left ankle: No gross deformity or discolorations noted. Limited ROM, secondary to discomfort. Distal strength and sensation intact. Neg Talar tilt. Discomfort with flexion and extension. Distal pulses intact. Mild TTP to the achilles tendon, but it remains intact. No visible or palpable edema noted.  Slight antalgic gait.    Pre-Existing Condition(s):     Assessment:   Condition Same    Status: Additional Care Required  Permanent Disability:No    Plan: PT    Diagnostics:      Comments:  Follow-up in 2 weeks  Restricted duty  Physical therapy appointments as scheduled  Recommend continue with OTC Tylenol/ibuprofen, ice, elevation, and gentle range of motion stretching exercises as demonstrated  Recommend increasing weightbearing activities as  tolerated  Recommend using ankle splint while walking, with periodic breaks as needed for stretching and comfort    Disability Information   Status: Released to Restricted Duty    From:  2022  Through: 10/4/2022 Restrictions are: Temporary   Physical Restrictions   Sitting:    Standing:  < or = to 2 hrs/day Stooping:    Bending:      Squatting:    Walking:  < or = to 2 hrs/day Climbin hrs/day Pushing:      Pulling:    Other:    Reaching Above Shoulder (L):   Reaching Above Shoulder (R):       Reaching Below Shoulder (L):    Reaching Below Shoulder (R):      Not to exceed Weight Limits   Carrying(hrs):   Weight Limit(lb):   Lifting(hrs):   Weight  Limit(lb):     Comments: Recommend rotating standing and walking    Repetitive Actions   Hands: i.e. Fine Manipulations from Grasping:     Feet: i.e. Operating Foot Controls:     Driving / Operate Machinery:     Health Care Provider’s Original or Electronic Signature  DONA Miller Health Care Provider’s Original or Electronic Signature    Jarod Tabares MD         Clinic Name / Location: [address] [address] Clinic Phone Number: Dept: [phone]   Appointment Time: 4:00 Pm Visit Start Time: 4:00 PM   Check-In Time:  3:55 Pm Visit Discharge Time:  4:25PM   Original-Treating Physician or Chiropractor    Page 2-Insurer/TPA    Page 3-Employer    Page 4-Employee

## 2022-09-20 NOTE — PROGRESS NOTES
Subjective:     Mariah Muhammad is a 21 y.o. female who presents for Follow-Up      DOI 8/21/22: CASSI:  A coworker was pushing another cart near her when her coworker's cart struck her cart causing her cart to jump up and then down striking her left ankle. Today patient states that she continues to have discomfort with walking. She is trying to walk without compensating.    No numbness or tingling. She states that she was wearing a ankle splint. Continued use of Ibuprofen. She has not been back to work. She is starting physical therapy. Plan of care discussed with patient.     Review of Systems   Constitutional: Negative.    Respiratory: Negative.     Cardiovascular: Negative.    Musculoskeletal:  Positive for joint pain and myalgias.   Skin: Negative.    Neurological:  Negative for tingling, sensory change and weakness.   Psychiatric/Behavioral: Negative.       SOCHX: Works as   at [employer]  FH: No pertinent family history to this problem       Objective:     There were no vitals taken for this visit.    Constitutional: Patient is in no acute distress. Appears well-developed and well-nourished.   Cardiovascular: Normal rate.    Pulmonary/Chest: Effort normal. No respiratory distress.   Neurological: Patient is alert and oriented to person, place, and time.   Skin: Skin is warm and dry.   Psychiatric: Normal mood and affect. Behavior is normal.     Left ankle: No gross deformity or discolorations noted. Limited ROM, secondary to discomfort. Distal strength and sensation intact. Neg Talar tilt. Discomfort with flexion and extension. Distal pulses intact. Mild TTP to the achilles tendon, but it remains intact. No visible or palpable edema noted.  Slight antalgic gait.     Assessment/Plan:       1. Strain of left ankle, subsequent encounter    Released to Restricted Duty FROM 9/20/2022 TO 10/4/2022  Recommend rotating standing and walking    Follow-up in 2 weeks  Restricted duty  Physical therapy  appointments as scheduled  Recommend continue with OTC Tylenol/ibuprofen, ice, elevation, and gentle range of motion stretching exercises as demonstrated  Recommend increasing weightbearing activities as tolerated  Recommend using ankle splint while walking, with periodic breaks as needed for stretching and comfort    Differential diagnosis, natural history, supportive care, and indications for immediate follow-up discussed.    Approximately 25 minutes was spent in preparing for visit, obtaining history, exam and evaluation, patient counseling/education and post visit documentation/orders.

## 2022-09-29 ENCOUNTER — PHYSICAL THERAPY (OUTPATIENT)
Dept: PHYSICAL THERAPY | Facility: REHABILITATION | Age: 22
End: 2022-09-29
Attending: NURSE PRACTITIONER
Payer: COMMERCIAL

## 2022-09-29 DIAGNOSIS — S96.912D: ICD-10-CM

## 2022-09-29 PROCEDURE — 97140 MANUAL THERAPY 1/> REGIONS: CPT

## 2022-09-29 PROCEDURE — 97161 PT EVAL LOW COMPLEX 20 MIN: CPT

## 2022-09-29 PROCEDURE — 97014 ELECTRIC STIMULATION THERAPY: CPT

## 2022-09-29 ASSESSMENT — ENCOUNTER SYMPTOMS
PAIN SCALE AT HIGHEST: 5
PAIN SCALE: 1

## 2022-09-29 NOTE — OP THERAPY EVALUATION
"  Outpatient Physical Therapy  INITIAL EVALUATION    [facility] Physical [address].  [address]  [facility] [address]  Phone:  [phone]  Fax:  [phone]    Date of Evaluation: 2022    Patient: Mariah Muhammad  YOB: 2000  MRN: [id number]     Referring Provider: DONA Miller  [address] [place] [id number]   Referring Diagnosis Strain of left ankle, subsequent encounter [S96.912D]     Time Calculation  Start time: 0330  Stop time: 0430 Time Calculation (min): 60 minutes         Chief Complaint: No chief complaint on file.    Visit Diagnoses     ICD-10-CM   1. 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 her cart was pushed into the back of her R ankle with abrasion and mild --no  achymosis with mild swelling for a few week    Prior level of function:  Food services    Pain:     Current pain ratin    At worst pain ratin    Location:  Psot heel pain    Aggravating factors:  Up more than 5 step w/o pain  Walk more than 5' w/o pain  Jump more than 2 times  Sleep: no limit      Past Medical History:   Diagnosis Date    Anemia     Eczema      Past Surgical History:   Procedure Laterality Date    ADENOIDECTOMY      TONSILLECTOMY      TONSILLECTOMY         Precautions:       Objective   Palpation and joint mobility:     Fig 8 girth assesementR: 18 3/4  L 18/15    TTP lateral calcaneal insertion and 1.5 proximial to insertion         Therapeutic Treatments and Modalities:     Therapeutic Treatment and Modalities Summary: Squats with focus on ant tib translation  Heel rasie up with both feet lower ioth L   Self desensitization with sheet/towel  Russian 5/5 mhp to lower log/achilles region // better\"  instructed to ride stationary bike and practice sls balance " hourly  Time-based treatments/modalities:    Physical Therapy Timed Treatment Charges  Manual therapy minutes (CPT 30351): 5 minutes  Therapeutic exercise minutes (CPT 42614): 10 minutes      Assessment, Response and Plan:   Assessment details:  Patient present with ankle joint strain/contusion with  limited weight bearing tolerance with standing, walking and  functional squatting activities.  Patient present with decreased arm swing,short stride, a/d and ace wrap upon clinic --instructed patient to no longer use any a/d or wrap/brace and to increase bike and other motion activities as tolerated.  Patient presented with high fear pain avoidance and allodynia.  Instructed patient on desensitization techniques and educated with TNE.  Patient should do well if compliant with poc.     Prognosis: good    Goals:   Short Term Goals:   Up more than 5 step w/o pain  Walk more than 5' w/o pain    LEFS >55/80  Short term goal time span:  2-4 weeks      Long Term Goals:    Up more than 15 step w/o pain  Walk more than 15' w/o pain  Jump more than 2 times w/o pain  LEFS >70/80  Long term goal time span:  6-8 weeks    Plan:   Planned therapy interventions:  Manual Therapy (CPT 21518), E Stim Unattended (CPT 85421), Therapeutic Exercise (CPT 89804) and Neuromuscular Re-education (CPT 70077)  Frequency:  2x week  Duration in weeks:  8  Duration in visits:  16  Plan details:  Balance trg, joint mobs, IASTM, cupping, estim, strength, gait trg.    Functional Assessment Used  PT Functional Assessment Tool Used: lefs  PT Functional Assessment Score: 38/80     Referring provider co-signature:  I have reviewed this plan of care and my co-signature certifies the need for services.    Certification Period: 09/29/2022 to  12/03/22    Physician Signature: ________________________________ Date: ______________

## 2022-10-04 ENCOUNTER — PHYSICAL THERAPY (OUTPATIENT)
Dept: PHYSICAL THERAPY | Facility: REHABILITATION | Age: 22
End: 2022-10-04
Attending: NURSE PRACTITIONER
Payer: COMMERCIAL

## 2022-10-04 ENCOUNTER — OCCUPATIONAL MEDICINE (OUTPATIENT)
Dept: OCCUPATIONAL MEDICINE | Facility: CLINIC | Age: 22
End: 2022-10-04
Payer: COMMERCIAL

## 2022-10-04 ENCOUNTER — IMMUNIZATION (OUTPATIENT)
Dept: OCCUPATIONAL MEDICINE | Facility: CLINIC | Age: 22
End: 2022-10-04

## 2022-10-04 VITALS
HEIGHT: 65 IN | RESPIRATION RATE: 14 BRPM | WEIGHT: 149 LBS | OXYGEN SATURATION: 98 % | TEMPERATURE: 98.3 F | HEART RATE: 72 BPM | BODY MASS INDEX: 24.83 KG/M2

## 2022-10-04 DIAGNOSIS — S96.912D STRAIN OF LEFT ANKLE, SUBSEQUENT ENCOUNTER: ICD-10-CM

## 2022-10-04 DIAGNOSIS — Z23 NEED FOR VACCINATION: Primary | ICD-10-CM

## 2022-10-04 DIAGNOSIS — S96.912D: ICD-10-CM

## 2022-10-04 PROCEDURE — 97014 ELECTRIC STIMULATION THERAPY: CPT

## 2022-10-04 PROCEDURE — 99213 OFFICE O/P EST LOW 20 MIN: CPT | Performed by: NURSE PRACTITIONER

## 2022-10-04 PROCEDURE — 90686 IIV4 VACC NO PRSV 0.5 ML IM: CPT | Performed by: PREVENTIVE MEDICINE

## 2022-10-04 PROCEDURE — 97110 THERAPEUTIC EXERCISES: CPT

## 2022-10-04 PROCEDURE — 97140 MANUAL THERAPY 1/> REGIONS: CPT

## 2022-10-04 ASSESSMENT — ENCOUNTER SYMPTOMS
SENSORY CHANGE: 0
RESPIRATORY NEGATIVE: 1
WEAKNESS: 0
CONSTITUTIONAL NEGATIVE: 1
TINGLING: 0
CARDIOVASCULAR NEGATIVE: 1
MYALGIAS: 1
PSYCHIATRIC NEGATIVE: 1

## 2022-10-04 ASSESSMENT — FIBROSIS 4 INDEX: FIB4 SCORE: 0.23

## 2022-10-04 NOTE — OP THERAPY DAILY TREATMENT
"  Outpatient Physical Therapy  DAILY TREATMENT     [facility] Physical [address].  [address]  [place] [address]  Phone:  [phone]  Fax:  [phone]    Date: 10/04/2022    Patient: Mariah Muhammad  YOB: 2000  MRN: [id number]     Time Calculation    Start time: 0855  Stop time: 0955 Time Calculation (min): 60 minutes         Chief Complaint: No chief complaint on file.    Visit #: 2  10' late  SUBJECTIVE:  Much better but still having a little pain in a new area on the     OBJECTIVE:            Therapeutic Treatments and Modalities:     Therapeutic Treatment and Modalities Summary: Sls balance x 40\" increased medial malleoli  Stm to soleus  P/a TC joint mobs gd 3-4  CFM to distal achilles  Sls x  1'  SCS : post tibialis// no pain upon standing\" gone\"--instructed in self SCS  Sls x 1'- no pain\"  Bilateral blue bosu x 3' with squats for last minute  Gabonese to post. Tib, medial malleolus 5/5 x 15' w/  Mhp    Time-based treatments/modalities:    Physical Therapy Timed Treatment Charges  Manual therapy minutes (CPT 02469): 25 minutes  Therapeutic exercise minutes (CPT 74130): 15 minutes      Pain rating (1-10) before treatment:  1--medial, malleoli  Pain rating (1-10) after treatment:  0\" no pain    ASSESSMENT:   Patient able to tolerate 4' balance on blue bosu at end of treatment w/o pain.  Improved movement w/ less pain--cont. To emphasize movement over immobilization even with a little pain      PLAN/RECOMMENDATIONS:   Progress balance trg, IASTM cupping, bike, squats, SCS e-stim as indicated         "

## 2022-10-04 NOTE — PROGRESS NOTES
"Subjective:     Mariah Muhammad is a 22 y.o. female who presents for Follow-Up (BETTER -RM 16/)      DOI 8/21/22: CASSI:  A coworker was pushing another cart near her when her coworker's cart struck her cart causing her cart to jump up and then down striking her left ankle. Today overall feeling better, does have some pain to the Achilles tendon region otherwise no worsening.  No numbness or tingling.  Continued use of Ibuprofen. She has not been back to work.  She would like to do full duty at this time.  Physical therapy has been helpful, states physical therapist like to see her 1 more time and if she is okay release her at that time.  Plan of care discussed with patient.     Review of Systems   Constitutional: Negative.    Respiratory: Negative.     Cardiovascular: Negative.    Musculoskeletal:  Positive for myalgias. Negative for joint pain.   Skin: Negative.    Neurological:  Negative for tingling, sensory change and weakness.   Psychiatric/Behavioral: Negative.       SOCHX: Works as   at [employer]  FH: No pertinent family history to this problem       Objective:     Pulse 72   Temp 36.8 °C (98.3 °F) (Temporal)   Resp 14   Ht 1.651 m (5' 5\")   Wt 67.6 kg (149 lb)   SpO2 98%   BMI 24.79 kg/m²     Constitutional: Patient is in no acute distress. Appears well-developed and well-nourished.   Cardiovascular: Normal rate.    Pulmonary/Chest: Effort normal. No respiratory distress.   Neurological: Patient is alert and oriented to person, place, and time.   Skin: Skin is warm and dry.   Psychiatric: Normal mood and affect. Behavior is normal.     Left ankle: No gross deformity or discolorations noted. FROM, minimal discomfort. Distal strength and sensation intact. Neg Talar tilt. Discomfort with flexion and extension. Distal pulses intact. Mild TTP to the achilles tendon, but it remains intact. No visible or palpable edema noted.  No gait abnormalities noted.      Assessment/Plan:       1. Strain " of left ankle, subsequent encounter    Released to Full Duty FROM 10/4/2022 TO 10/12/2022       Follow-up in 1 week  Full duty  Physical therapy appointments as scheduled  Recommend continue with warm compresses, elevation, and gentle range of motion exercises as tolerated    Differential diagnosis, natural history, supportive care, and indications for immediate follow-up discussed.    Approximately 25 minutes was spent in preparing for visit, obtaining history, exam and evaluation, patient counseling/education and post visit documentation/orders.

## 2022-10-04 NOTE — LETTER
[address],   [address] [place] [address]  Phone:  [phone] - Fax:  [phone]   Occupational Health [facility] Progress Report and Disability Certification  Date of Service: 10/4/2022   No Show:  No  Date / Time of Next Visit: 10/12/2022@   Claim Information   Patient Name: Mariah Muhammad  Claim Number:     Employer: [employer]  Date of Injury: 8/21/2022     Insurer / TPA: Workers Choice  ID / SSN:     Occupation:   Diagnosis: The encounter diagnosis was Strain of left ankle, subsequent encounter.    Medical Information   Related to Industrial Injury? Yes    Subjective Complaints:  DOI 8/21/22: CASSI:  A coworker was pushing another cart near her when her coworker's cart struck her cart causing her cart to jump up and then down striking her left ankle. Today overall feeling better, does have some pain to the Achilles tendon region otherwise no worsening.  No numbness or tingling.  Continued use of Ibuprofen. She has not been back to work.  She would like to do full duty at this time.  Physical therapy has been helpful, states physical therapist like to see her 1 more time and if she is okay release her at that time.  Plan of care discussed with patient.    Objective Findings: Left ankle: No gross deformity or discolorations noted. FROM, minimal discomfort. Distal strength and sensation intact. Neg Talar tilt. Discomfort with flexion and extension. Distal pulses intact. Mild TTP to the achilles tendon, but it remains intact. No visible or palpable edema noted.  No gait abnormalities noted.     Pre-Existing Condition(s):     Assessment:   Condition Improved    Status: Additional Care Required  Permanent Disability:No    Plan: PT    Diagnostics:      Comments:  Follow-up in 1 week  Full duty  Physical therapy appointments as scheduled  Recommend continue with warm compresses, elevation, and gentle range of motion exercises as tolerated     Disability Information   Status: Released to Full Duty    From:  10/4/2022  Through: 10/12/2022 Restrictions are:     Physical Restrictions   Sitting:    Standing:    Stooping:    Bending:      Squatting:    Walking:    Climbing:    Pushing:      Pulling:    Other:    Reaching Above Shoulder (L):   Reaching Above Shoulder (R):       Reaching Below Shoulder (L):    Reaching Below Shoulder (R):      Not to exceed Weight Limits   Carrying(hrs):   Weight Limit(lb):   Lifting(hrs):   Weight  Limit(lb):     Comments:      Repetitive Actions   Hands: i.e. Fine Manipulations from Grasping:     Feet: i.e. Operating Foot Controls:     Driving / Operate Machinery:     Health Care Provider’s Original or Electronic Signature  DONA Miller Health Care Provider’s Original or Electronic Signature    Jarod Tabares MD         Clinic Name / Location: [address],   [address] [place] [address] Clinic Phone Number: Dept: [phone]   Appointment Time: 1:00 Pm Visit Start Time: 1:04 PM   Check-In Time:  12:58 Pm Visit Discharge Time:  1:32pm   Original-Treating Physician or Chiropractor    Page 2-Insurer/TPA    Page 3-Employer    Page 4-Employee

## 2022-10-11 ENCOUNTER — PHYSICAL THERAPY (OUTPATIENT)
Dept: PHYSICAL THERAPY | Facility: REHABILITATION | Age: 22
End: 2022-10-11
Attending: NURSE PRACTITIONER
Payer: COMMERCIAL

## 2022-10-11 DIAGNOSIS — S96.912D: ICD-10-CM

## 2022-10-11 PROCEDURE — 97140 MANUAL THERAPY 1/> REGIONS: CPT

## 2022-10-11 PROCEDURE — 97014 ELECTRIC STIMULATION THERAPY: CPT

## 2022-10-11 PROCEDURE — 97110 THERAPEUTIC EXERCISES: CPT

## 2022-10-11 NOTE — OP THERAPY DAILY TREATMENT
"  Outpatient Physical Therapy  DAILY TREATMENT     [facility] Physical Therapy [address].  [address]  [place] [address]  Phone:  [phone]  Fax:  [phone]    Date: 10/11/2022    Patient: Mariah Muhammad  YOB: 2000  MRN: [id number]     Time Calculation    Start time: 0255  Stop time: 0344 Time Calculation (min): 49 minutes         Chief Complaint: No chief complaint on file.    Visit #: 3  6' late  SUBJECTIVE:  Better able to tolerate more work activities with less pain--move heavy objects with minimal pain.  Big improvement w/ being able to go up a flight of stairs with minimal pain    OBJECTIVE:            Therapeutic Treatments and Modalities:     Therapeutic Treatment and Modalities Summary: Cupping and cup-stripping medial malleolus up distal 1/3 medial tibial border-- arom and heel/toe walking and squats with cups  Bilateral balance on blue bosu--to fatigue x 5'  Sls x  1'    Russian to post. Tib, medial malleolus 5/5 x 15' w/  Mhp    Time-based treatments/modalities:    Physical Therapy Timed Treatment Charges  Manual therapy minutes (CPT 22220): 15 minutes  Therapeutic exercise minutes (CPT 34801): 10 minutes      Pain rating (1-10) before treatment:  0 no pain  Pain rating (1-10) after treatment:  0\" no pain    ASSESSMENT:   Increased gait and strength with no more constant pain.  Patient able to tolerate pushing and pulling tasks at work and strain without pain limiting behavior      PLAN/RECOMMENDATIONS:   Progress balance trg, IASTM cupping, bike, squats, SCS e-stim as indicated         "

## 2022-10-12 ENCOUNTER — OCCUPATIONAL MEDICINE (OUTPATIENT)
Dept: OCCUPATIONAL MEDICINE | Facility: CLINIC | Age: 22
End: 2022-10-12
Payer: COMMERCIAL

## 2022-10-12 VITALS
WEIGHT: 147.6 LBS | HEART RATE: 81 BPM | HEIGHT: 65 IN | TEMPERATURE: 98.3 F | RESPIRATION RATE: 16 BRPM | BODY MASS INDEX: 24.59 KG/M2 | SYSTOLIC BLOOD PRESSURE: 120 MMHG | OXYGEN SATURATION: 98 % | DIASTOLIC BLOOD PRESSURE: 48 MMHG

## 2022-10-12 DIAGNOSIS — S96.912D STRAIN OF LEFT ANKLE, SUBSEQUENT ENCOUNTER: ICD-10-CM

## 2022-10-12 PROCEDURE — 99213 OFFICE O/P EST LOW 20 MIN: CPT | Performed by: NURSE PRACTITIONER

## 2022-10-12 ASSESSMENT — ENCOUNTER SYMPTOMS
MYALGIAS: 1
WEAKNESS: 0
PSYCHIATRIC NEGATIVE: 1
CARDIOVASCULAR NEGATIVE: 1
TINGLING: 0
CONSTITUTIONAL NEGATIVE: 1
RESPIRATORY NEGATIVE: 1
SENSORY CHANGE: 0

## 2022-10-12 ASSESSMENT — FIBROSIS 4 INDEX: FIB4 SCORE: 0.23

## 2022-10-12 NOTE — LETTER
[facility]  [address],   [address] [place] [address]  Phone:  [phone] - Fax:  [phone]   Occupational Health [facility] Progress Report and Disability Certification  Date of Service: 10/12/2022   No Show:  No  Date / Time of Next Visit:   Discharged/MMI   Released to Full Duty    Claim Information   Patient Name: Mariah Muhammad  Claim Number:     Employer: [employer]  Date of Injury: 8/21/2022     Insurer / TPA: Workers Choice  ID / SSN:     Occupation:   Diagnosis: The encounter diagnosis was Strain of left ankle, subsequent encounter.    Medical Information   Related to Industrial Injury? Yes    Subjective Complaints:  DOI 8/21/22: CASSI:  A coworker was pushing another cart near her when her coworker's cart struck her cart causing her cart to jump up and then down striking her left ankle. Today overall feeling better work was a little rough but manageable with breaks as needed.  No numbness or tingling.  Continued use of Ibuprofen if needed. Physical therapy has been helpful, she was pretty sore after her last sessions. She will be released from care at this time.  She will be released from care at this time. Plan of care discussed with patient.    Objective Findings: Left ankle: No gross deformity or discolorations noted. FROM. Distal strength and sensation intact. Neg Talar tilt. No decrease with flexion and extension. Distal pulses intact. Non- tender to the achilles tendon, but it remains intact. No visible or palpable edema noted.  No gait abnormalities noted.     Pre-Existing Condition(s):     Assessment:   Condition Improved    Status: Discharged /  MMI  Permanent Disability:No    Plan:      Diagnostics:      Comments:  Discharged/MMI   Released to Full Duty   Follow-up if needed     Disability Information   Status: Released to Full Duty    From:  10/12/2022  Through:   Restrictions are:     Physical Restrictions   Sitting:     Standing:    Stooping:    Bending:      Squatting:    Walking:    Climbing:    Pushing:      Pulling:    Other:    Reaching Above Shoulder (L):   Reaching Above Shoulder (R):       Reaching Below Shoulder (L):    Reaching Below Shoulder (R):      Not to exceed Weight Limits   Carrying(hrs):   Weight Limit(lb):   Lifting(hrs):   Weight  Limit(lb):     Comments:      Repetitive Actions   Hands: i.e. Fine Manipulations from Grasping:     Feet: i.e. Operating Foot Controls:     Driving / Operate Machinery:     Health Care Provider’s Original or Electronic Signature  DONA Miller Health Care Provider’s Original or Electronic Signature    Jarod Tabares MD         Clinic Name / Location: [address],   [address] [place] [address] Clinic Phone Number: Dept: [phone]   Appointment Time: 8:15 Am Visit Start Time: 8:06 AM   Check-In Time:  7:59 Am Visit Discharge Time:  8:40am   Original-Treating Physician or Chiropractor    Page 2-Insurer/TPA    Page 3-Employer    Page 4-Employee

## 2022-10-12 NOTE — PROGRESS NOTES
"Subjective:     Mariah Muhammad is a 22 y.o. female who presents for Follow-Up (Wc DOI 8/21/22: left ankle feeling better rm 17)      DOI 8/21/22: CASSI:  A coworker was pushing another cart near her when her coworker's cart struck her cart causing her cart to jump up and then down striking her left ankle. Today overall feeling better work was a little rough but manageable with breaks as needed.  No numbness or tingling.  Continued use of Ibuprofen if needed. Physical therapy has been helpful, she was pretty sore after her last sessions. She will be released from care at this time.  She will be released from care at this time. Plan of care discussed with patient.     Review of Systems   Constitutional: Negative.    Respiratory: Negative.     Cardiovascular: Negative.    Musculoskeletal:  Positive for myalgias. Negative for joint pain.   Skin: Negative.    Neurological:  Negative for tingling, sensory change and weakness.   Psychiatric/Behavioral: Negative.       SOCHX: Works as   at [employer]  FH: No pertinent family history to this problem       Objective:     /48 (BP Location: Left arm, Patient Position: Sitting, BP Cuff Size: Large adult long)   Pulse 81   Temp 36.8 °C (98.3 °F) (Temporal)   Resp 16   Ht 1.651 m (5' 5\")   Wt 67 kg (147 lb 9.6 oz)   SpO2 98%   BMI 24.56 kg/m²     Constitutional: Patient is in no acute distress. Appears well-developed and well-nourished.   Cardiovascular: Normal rate.    Pulmonary/Chest: Effort normal. No respiratory distress.   Neurological: Patient is alert and oriented to person, place, and time.   Skin: Skin is warm and dry.   Psychiatric: Normal mood and affect. Behavior is normal.     Left ankle: No gross deformity or discolorations noted. FROM. Distal strength and sensation intact. Neg Talar tilt. No decrease with flexion and extension. Distal pulses intact. Non- tender to the achilles tendon, but it remains intact. No visible or palpable edema " noted.  No gait abnormalities noted.      Assessment/Plan:       1. Strain of left ankle, subsequent encounter    Released to Full Duty FROM 10/12/2022 TO         Discharged/MMI   Released to Full Duty   Follow-up if needed     Differential diagnosis, natural history, supportive care, and indications for immediate follow-up discussed.    Approximately 25 minutes was spent in preparing for visit, obtaining history, exam and evaluation, patient counseling/education and post visit documentation/orders.

## 2022-10-26 ENCOUNTER — HOSPITAL ENCOUNTER (OUTPATIENT)
Facility: MEDICAL CENTER | Age: 22
End: 2022-10-26
Attending: PHYSICIAN ASSISTANT
Payer: COMMERCIAL

## 2022-10-26 ENCOUNTER — OFFICE VISIT (OUTPATIENT)
Dept: URGENT CARE | Facility: PHYSICIAN GROUP | Age: 22
End: 2022-10-26
Payer: COMMERCIAL

## 2022-10-26 VITALS
OXYGEN SATURATION: 96 % | DIASTOLIC BLOOD PRESSURE: 82 MMHG | BODY MASS INDEX: 24.66 KG/M2 | SYSTOLIC BLOOD PRESSURE: 122 MMHG | HEART RATE: 87 BPM | HEIGHT: 65 IN | WEIGHT: 148 LBS | TEMPERATURE: 98.1 F | RESPIRATION RATE: 16 BRPM

## 2022-10-26 DIAGNOSIS — M54.50 ACUTE LOW BACK PAIN WITHOUT SCIATICA, UNSPECIFIED BACK PAIN LATERALITY: ICD-10-CM

## 2022-10-26 DIAGNOSIS — N89.8 VAGINAL DISCHARGE: ICD-10-CM

## 2022-10-26 LAB
APPEARANCE UR: CLEAR
BILIRUB UR STRIP-MCNC: NEGATIVE MG/DL
COLOR UR AUTO: YELLOW
GLUCOSE UR STRIP.AUTO-MCNC: NEGATIVE MG/DL
INT CON NEG: NEGATIVE
INT CON POS: POSITIVE
KETONES UR STRIP.AUTO-MCNC: NEGATIVE MG/DL
LEUKOCYTE ESTERASE UR QL STRIP.AUTO: NORMAL
NITRITE UR QL STRIP.AUTO: NEGATIVE
PH UR STRIP.AUTO: 6 [PH] (ref 5–8)
POC URINE PREGNANCY TEST: NEGATIVE
PROT UR QL STRIP: NEGATIVE MG/DL
RBC UR QL AUTO: NEGATIVE
SP GR UR STRIP.AUTO: 1.03
UROBILINOGEN UR STRIP-MCNC: 1 MG/DL

## 2022-10-26 PROCEDURE — 87510 GARDNER VAG DNA DIR PROBE: CPT

## 2022-10-26 PROCEDURE — 81025 URINE PREGNANCY TEST: CPT | Performed by: PHYSICIAN ASSISTANT

## 2022-10-26 PROCEDURE — 87591 N.GONORRHOEAE DNA AMP PROB: CPT

## 2022-10-26 PROCEDURE — 99214 OFFICE O/P EST MOD 30 MIN: CPT | Performed by: PHYSICIAN ASSISTANT

## 2022-10-26 PROCEDURE — 87086 URINE CULTURE/COLONY COUNT: CPT

## 2022-10-26 PROCEDURE — 87660 TRICHOMONAS VAGIN DIR PROBE: CPT

## 2022-10-26 PROCEDURE — 87491 CHLMYD TRACH DNA AMP PROBE: CPT

## 2022-10-26 PROCEDURE — 81002 URINALYSIS NONAUTO W/O SCOPE: CPT | Performed by: PHYSICIAN ASSISTANT

## 2022-10-26 PROCEDURE — 87480 CANDIDA DNA DIR PROBE: CPT

## 2022-10-26 RX ORDER — CYCLOBENZAPRINE HCL 5 MG
5-10 TABLET ORAL
Qty: 15 TABLET | Refills: 0 | Status: SHIPPED | OUTPATIENT
Start: 2022-10-26

## 2022-10-26 RX ORDER — METRONIDAZOLE 500 MG/1
500 TABLET ORAL 2 TIMES DAILY
Qty: 14 TABLET | Refills: 0 | Status: SHIPPED | OUTPATIENT
Start: 2022-10-26 | End: 2022-11-02

## 2022-10-26 ASSESSMENT — ENCOUNTER SYMPTOMS
DIARRHEA: 0
FLANK PAIN: 1
NAUSEA: 1
VOMITING: 0
COUGH: 0
FEVER: 0
MYALGIAS: 0
CONSTIPATION: 0
SHORTNESS OF BREATH: 0
HEADACHES: 0
EYE PAIN: 0
CHILLS: 0
ABDOMINAL PAIN: 0
SORE THROAT: 0

## 2022-10-26 ASSESSMENT — FIBROSIS 4 INDEX: FIB4 SCORE: 0.23

## 2022-10-27 LAB
C TRACH DNA GENITAL QL NAA+PROBE: NEGATIVE
CANDIDA DNA VAG QL PROBE+SIG AMP: POSITIVE
G VAGINALIS DNA VAG QL PROBE+SIG AMP: POSITIVE
N GONORRHOEA DNA GENITAL QL NAA+PROBE: NEGATIVE
SPECIMEN SOURCE: NORMAL
T VAGINALIS DNA VAG QL PROBE+SIG AMP: NEGATIVE

## 2022-10-27 NOTE — PROGRESS NOTES
"Subjective:   Mariah Muhammad is a 22 y.o. female who presents for Flank Pain (Onset 3 days), Vaginal Discharge (Green discharge /Onset today), and Nausea (Onset 1 week)      Is a pleasant 22-year-old female who began experiencing lumbar back pain that started around 3 days ago, she notes bilateral back pain.  Its mildly improved with ibuprofen.  She does not note any inciting injury or trauma.  She became more concerned today when she began experiencing vaginal discharge.  She is monogamous with a male partner with exclusive condom use.  In the past she had a urinary tract infection that developed into pyelonephritis but she notes this does feel different.  She is now noted any fevers, vomiting, abdominal pain but has had some intermittent nausea for around 1 week    Review of Systems   Constitutional:  Negative for chills and fever.   HENT:  Negative for congestion, ear pain and sore throat.    Eyes:  Negative for pain.   Respiratory:  Negative for cough and shortness of breath.    Cardiovascular:  Negative for chest pain.   Gastrointestinal:  Positive for nausea. Negative for abdominal pain, constipation, diarrhea and vomiting.   Genitourinary:  Positive for flank pain. Negative for dysuria.   Musculoskeletal:  Negative for myalgias.   Skin:  Negative for rash.   Neurological:  Negative for headaches.     Medications, Allergies, and current problem list reviewed today in Epic.     Objective:     /82 (BP Location: Right arm, Patient Position: Sitting, BP Cuff Size: Adult)   Pulse 87   Temp 36.7 °C (98.1 °F) (Temporal)   Resp 16   Ht 1.651 m (5' 5\")   Wt 67.1 kg (148 lb)   SpO2 96%     Physical Exam  Vitals reviewed.   Constitutional:       Appearance: Normal appearance.   HENT:      Head: Normocephalic and atraumatic.      Right Ear: External ear normal.      Left Ear: External ear normal.      Nose: Nose normal.      Mouth/Throat:      Mouth: Mucous membranes are moist.   Eyes:      " Conjunctiva/sclera: Conjunctivae normal.   Cardiovascular:      Rate and Rhythm: Normal rate and regular rhythm.   Pulmonary:      Effort: Pulmonary effort is normal.      Breath sounds: Normal breath sounds.   Abdominal:      Tenderness: There is no right CVA tenderness or left CVA tenderness.   Genitourinary:     Comments: Exam deferred  Musculoskeletal:      Comments: Mild bilateral lumbar paraspinal region TTP without spasm.  No midline step-off, crepitance ecchymosis or deformity   Skin:     General: Skin is warm and dry.      Capillary Refill: Capillary refill takes less than 2 seconds.   Neurological:      Mental Status: She is alert and oriented to person, place, and time.       Assessment/Plan:     Diagnosis and associated orders:     1. Acute low back pain without sciatica, unspecified back pain laterality  cyclobenzaprine (FLEXERIL) 5 mg tablet      2. Vaginal discharge  Chlamydia/GC, PCR (Genital/Anal swab)    VAGINAL PATHOGENS DNA PANEL    URINE CULTURE(NEW)    metroNIDAZOLE (FLAGYL) 500 MG Tab    POCT Urinalysis    POCT PREGNANCY         Comments/MDM:     Discussed with the per patient and her mom that her urinalysis is equivocal for any urinary tract infection, she was concerned about possible Michael however her back pain is far lower down and more consistent with musculoskeletal etiology.  She does struggle with her posture and has had an issue with back pain in the past.  I recommend that she continue alternating Tylenol and ibuprofen, trial Flexeril when not at work and mostly at bedtime and try Epsom salt baths and introduce gentle stretching gradually.  We will trial empiric treatment with metronidazole for presumed bacterial vaginosis but I encouraged her to perform self swab for both STDs and vaginal pathogens to be comprehensive.  I suspect she is got 2 separate etiologies in the form of musculoskeletal back pain as well as a bacterial vaginitis.  Mom agrees and demonstrated good insight and  understanding and the patient plans to follow-up if she is not improving         Differential diagnosis, natural history, supportive care, and indications for immediate follow-up discussed.    Advised the patient to follow-up with the primary care physician for recheck, reevaluation, and consideration of further management.    Please note that this dictation was created using voice recognition software. I have made a reasonable attempt to correct obvious errors, but I expect that there are errors of grammar and possibly content that I did not discover before finalizing the note.    This note was electronically signed by Chintan Queen PA-C

## 2022-10-28 DIAGNOSIS — B37.9 YEAST INFECTION: ICD-10-CM

## 2022-10-28 RX ORDER — FLUCONAZOLE 150 MG/1
TABLET ORAL
Qty: 2 TABLET | Refills: 0 | Status: SHIPPED | OUTPATIENT
Start: 2022-10-28

## 2022-10-29 LAB
BACTERIA UR CULT: NORMAL
SIGNIFICANT IND 70042: NORMAL
SITE SITE: NORMAL
SOURCE SOURCE: NORMAL

## 2022-11-17 ENCOUNTER — HOSPITAL ENCOUNTER (EMERGENCY)
Facility: MEDICAL CENTER | Age: 22
End: 2022-11-18
Attending: EMERGENCY MEDICINE
Payer: COMMERCIAL

## 2022-11-17 DIAGNOSIS — N93.9 VAGINAL BLEEDING: ICD-10-CM

## 2022-11-17 DIAGNOSIS — R10.9 ABDOMINAL CRAMPING: ICD-10-CM

## 2022-11-17 LAB
ALBUMIN SERPL BCP-MCNC: 4 G/DL (ref 3.2–4.9)
ALBUMIN/GLOB SERPL: 1.9 G/DL
ALP SERPL-CCNC: 61 U/L (ref 30–99)
ALT SERPL-CCNC: 8 U/L (ref 2–50)
ANION GAP SERPL CALC-SCNC: 11 MMOL/L (ref 7–16)
AST SERPL-CCNC: 14 U/L (ref 12–45)
B-HCG SERPL-ACNC: <1 MIU/ML (ref 0–5)
BASOPHILS # BLD AUTO: 0.8 % (ref 0–1.8)
BASOPHILS # BLD: 0.08 K/UL (ref 0–0.12)
BILIRUB SERPL-MCNC: 0.3 MG/DL (ref 0.1–1.5)
BUN SERPL-MCNC: 7 MG/DL (ref 8–22)
CALCIUM SERPL-MCNC: 8.8 MG/DL (ref 8.5–10.5)
CHLORIDE SERPL-SCNC: 110 MMOL/L (ref 96–112)
CO2 SERPL-SCNC: 21 MMOL/L (ref 20–33)
CREAT SERPL-MCNC: 0.63 MG/DL (ref 0.5–1.4)
EOSINOPHIL # BLD AUTO: 0.19 K/UL (ref 0–0.51)
EOSINOPHIL NFR BLD: 2 % (ref 0–6.9)
ERYTHROCYTE [DISTWIDTH] IN BLOOD BY AUTOMATED COUNT: 40.7 FL (ref 35.9–50)
GFR SERPLBLD CREATININE-BSD FMLA CKD-EPI: 130 ML/MIN/1.73 M 2
GLOBULIN SER CALC-MCNC: 2.1 G/DL (ref 1.9–3.5)
GLUCOSE SERPL-MCNC: 97 MG/DL (ref 65–99)
HCT VFR BLD AUTO: 39.7 % (ref 37–47)
HGB BLD-MCNC: 12.8 G/DL (ref 12–16)
IMM GRANULOCYTES # BLD AUTO: 0.03 K/UL (ref 0–0.11)
IMM GRANULOCYTES NFR BLD AUTO: 0.3 % (ref 0–0.9)
LYMPHOCYTES # BLD AUTO: 3.12 K/UL (ref 1–4.8)
LYMPHOCYTES NFR BLD: 32.1 % (ref 22–41)
MCH RBC QN AUTO: 26.3 PG (ref 27–33)
MCHC RBC AUTO-ENTMCNC: 32.2 G/DL (ref 33.6–35)
MCV RBC AUTO: 81.7 FL (ref 81.4–97.8)
MONOCYTES # BLD AUTO: 0.74 K/UL (ref 0–0.85)
MONOCYTES NFR BLD AUTO: 7.6 % (ref 0–13.4)
NEUTROPHILS # BLD AUTO: 5.55 K/UL (ref 2–7.15)
NEUTROPHILS NFR BLD: 57.2 % (ref 44–72)
NRBC # BLD AUTO: 0 K/UL
NRBC BLD-RTO: 0 /100 WBC
NUMBER OF RH DOSES IND 8505RD: NORMAL
PLATELET # BLD AUTO: 334 K/UL (ref 164–446)
PMV BLD AUTO: 11 FL (ref 9–12.9)
POTASSIUM SERPL-SCNC: 3.5 MMOL/L (ref 3.6–5.5)
PROT SERPL-MCNC: 6.1 G/DL (ref 6–8.2)
RBC # BLD AUTO: 4.86 M/UL (ref 4.2–5.4)
RH BLD: NORMAL
SODIUM SERPL-SCNC: 142 MMOL/L (ref 135–145)
WBC # BLD AUTO: 9.7 K/UL (ref 4.8–10.8)

## 2022-11-17 PROCEDURE — 86901 BLOOD TYPING SEROLOGIC RH(D): CPT

## 2022-11-17 PROCEDURE — 80053 COMPREHEN METABOLIC PANEL: CPT

## 2022-11-17 PROCEDURE — 84702 CHORIONIC GONADOTROPIN TEST: CPT

## 2022-11-17 PROCEDURE — 99284 EMERGENCY DEPT VISIT MOD MDM: CPT

## 2022-11-17 PROCEDURE — 85025 COMPLETE CBC W/AUTO DIFF WBC: CPT

## 2022-11-17 PROCEDURE — 36415 COLL VENOUS BLD VENIPUNCTURE: CPT

## 2022-11-17 ASSESSMENT — FIBROSIS 4 INDEX: FIB4 SCORE: 0.23

## 2022-11-18 ENCOUNTER — APPOINTMENT (OUTPATIENT)
Dept: RADIOLOGY | Facility: MEDICAL CENTER | Age: 22
End: 2022-11-18
Attending: EMERGENCY MEDICINE
Payer: COMMERCIAL

## 2022-11-18 VITALS
HEIGHT: 65 IN | BODY MASS INDEX: 24.24 KG/M2 | TEMPERATURE: 97.5 F | RESPIRATION RATE: 16 BRPM | WEIGHT: 145.5 LBS | SYSTOLIC BLOOD PRESSURE: 140 MMHG | HEART RATE: 74 BPM | DIASTOLIC BLOOD PRESSURE: 90 MMHG | OXYGEN SATURATION: 97 %

## 2022-11-18 LAB
APPEARANCE UR: CLEAR
BACTERIA #/AREA URNS HPF: NEGATIVE /HPF
BILIRUB UR QL STRIP.AUTO: NEGATIVE
COLOR UR: YELLOW
EPI CELLS #/AREA URNS HPF: NEGATIVE /HPF
GLUCOSE UR STRIP.AUTO-MCNC: NEGATIVE MG/DL
HYALINE CASTS #/AREA URNS LPF: ABNORMAL /LPF
KETONES UR STRIP.AUTO-MCNC: 15 MG/DL
LEUKOCYTE ESTERASE UR QL STRIP.AUTO: NEGATIVE
MICRO URNS: ABNORMAL
NITRITE UR QL STRIP.AUTO: NEGATIVE
PH UR STRIP.AUTO: 5 [PH] (ref 5–8)
PROT UR QL STRIP: NEGATIVE MG/DL
RBC # URNS HPF: ABNORMAL /HPF
RBC UR QL AUTO: ABNORMAL
SP GR UR STRIP.AUTO: 1.02
UROBILINOGEN UR STRIP.AUTO-MCNC: 0.2 MG/DL
WBC #/AREA URNS HPF: ABNORMAL /HPF

## 2022-11-18 PROCEDURE — 81001 URINALYSIS AUTO W/SCOPE: CPT

## 2022-11-18 PROCEDURE — 76856 US EXAM PELVIC COMPLETE: CPT

## 2022-11-18 NOTE — ED PROVIDER NOTES
ED Provider Note    CHIEF COMPLAINT  Chief Complaint   Patient presents with    Vaginal Bleeding     Arrives with c/o vaginal bleeding, cramping since 10AM, also states she noticed some tissue in the blood  Pt unsure of amount of blood loss but states it was a heavy,steady amount  States approx 7-8 wks into pregnancy  States hx of miscarriage 2 yrs ago            HPI    Primary care provider: No primary care provider on file.   History obtained from: Patient and mother  History limited by: None     Mariah Muhammad is a 22 y.o. female who presents to the ED with mother complaining of vaginal bleeding and lower abdominal cramping with pregnancy.  Patient reports this is her second pregnancy and she had 1 prior miscarriage 2 years ago.  She believes her blood type is O+ but not completely sure.  She states that she had several positive home pregnancy tests followed by blood test on November 8 confirming pregnancy.  She started having vaginal bleeding around 10:00 this morning followed by lower abdominal cramping about 1 hour later.  She denies fever.  She denies pain elsewhere.  She has had nausea and vomiting during this pregnancy.  She denies diarrhea/constipation/dysuria/edema.  She was seen at urgent care last month and was positive for Candida and Gardnerella and reports that she received treatment.  She denies history of sexually transmitted infections.    REVIEW OF SYSTEMS  Please see HPI for pertinent positives/negatives.  All other systems reviewed and are negative.     PAST MEDICAL HISTORY  Past Medical History:   Diagnosis Date    Anemia     Eczema         SURGICAL HISTORY  Past Surgical History:   Procedure Laterality Date    ADENOIDECTOMY      TONSILLECTOMY      TONSILLECTOMY          SOCIAL HISTORY  Social History     Tobacco Use    Smoking status: Never    Smokeless tobacco: Never   Vaping Use    Vaping Use: Never used   Substance and Sexual Activity    Alcohol use: Never    Drug use: Never     "Sexual activity: Not on file        FAMILY HISTORY  History reviewed. No pertinent family history.     CURRENT MEDICATIONS  Home Medications       Reviewed by Santos Tineo R.N. (Registered Nurse) on 11/17/22 at 2023  Med List Status: Not Addressed     Medication Last Dose Status   ALPRAZolam (XANAX) 0.5 MG Tab  Active   cyclobenzaprine (FLEXERIL) 5 mg tablet  Active   cyclobenzaprine (FLEXERIL) 5 mg tablet  Active   fluconazole (DIFLUCAN) 150 MG tablet  Active   Levonorgestrel-Eth Estradiol (TWIRLA) 120-30 MCG/24HR PATCH WEEKLY  Active   mirtazapine (REMERON) 15 MG Tab  Active   ondansetron (ZOFRAN ODT) 4 MG TABLET DISPERSIBLE  Active                     ALLERGIES  No Known Allergies     PHYSICAL EXAM  VITAL SIGNS: BP (!) 140/90   Pulse 74   Temp 36.4 °C (97.5 °F) (Temporal)   Resp 16   Ht 1.651 m (5' 5\")   Wt 66 kg (145 lb 8.1 oz)   LMP  (LMP Unknown) Comment: 7-8wks pregnant, current bleeding  SpO2 97%   BMI 24.21 kg/m²  @CHIP[[id number]::@     Pulse ox interpretation: 99% I interpret this pulse ox as normal     Constitutional: Well developed, well nourished, alert in no apparent distress, nontoxic appearance    HENT: No external signs of trauma, normocephalic, mask on due to COVID-19 pandemic  Eyes: PERRL, conjunctiva without erythema, no discharge, no icterus    Neck: Soft and supple, trachea midline, no stridor, no tenderness, no LAD, no JVD, good ROM    Cardiovascular: Regular rate and rhythm, no murmurs/rubs/gallops, strong distal pulses and good perfusion    Thorax & Lungs: No respiratory distress, CTAB   Abdomen: Soft, mild tenderness across lower abdomen, nondistended, no guarding, no rebound, normal BS    Back: No CVAT    Extremities: No cyanosis, no edema, no gross deformity, good ROM, no tenderness, intact distal pulses with brisk cap refill    Skin: Warm, dry, no pallor/cyanosis, no rash noted except mild scattered eczema  Lymphatic: No lymphadenopathy noted    Neuro: A/O times 3, no focal deficits " noted    Psychiatric: Cooperative, flat affect      DIAGNOSTIC STUDIES / PROCEDURES        LABS  All labs reviewed by me.     Results for orders placed or performed during the hospital encounter of 11/17/22   CBC WITH DIFFERENTIAL   Result Value Ref Range    WBC 9.7 4.8 - 10.8 K/uL    RBC 4.86 4.20 - 5.40 M/uL    Hemoglobin 12.8 12.0 - 16.0 g/dL    Hematocrit 39.7 37.0 - 47.0 %    MCV 81.7 81.4 - 97.8 fL    MCH 26.3 (L) 27.0 - 33.0 pg    MCHC 32.2 (L) 33.6 - 35.0 g/dL    RDW 40.7 35.9 - 50.0 fL    Platelet Count 334 164 - 446 K/uL    MPV 11.0 9.0 - 12.9 fL    Neutrophils-Polys 57.20 44.00 - 72.00 %    Lymphocytes 32.10 22.00 - 41.00 %    Monocytes 7.60 0.00 - 13.40 %    Eosinophils 2.00 0.00 - 6.90 %    Basophils 0.80 0.00 - 1.80 %    Immature Granulocytes 0.30 0.00 - 0.90 %    Nucleated RBC 0.00 /100 WBC    Neutrophils (Absolute) 5.55 2.00 - 7.15 K/uL    Lymphs (Absolute) 3.12 1.00 - 4.80 K/uL    Monos (Absolute) 0.74 0.00 - 0.85 K/uL    Eos (Absolute) 0.19 0.00 - 0.51 K/uL    Baso (Absolute) 0.08 0.00 - 0.12 K/uL    Immature Granulocytes (abs) 0.03 0.00 - 0.11 K/uL    NRBC (Absolute) 0.00 K/uL   COMP METABOLIC PANEL   Result Value Ref Range    Sodium 142 135 - 145 mmol/L    Potassium 3.5 (L) 3.6 - 5.5 mmol/L    Chloride 110 96 - 112 mmol/L    Co2 21 20 - 33 mmol/L    Anion Gap 11.0 7.0 - 16.0    Glucose 97 65 - 99 mg/dL    Bun 7 (L) 8 - 22 mg/dL    Creatinine 0.63 0.50 - 1.40 mg/dL    Calcium 8.8 8.5 - 10.5 mg/dL    AST(SGOT) 14 12 - 45 U/L    ALT(SGPT) 8 2 - 50 U/L    Alkaline Phosphatase 61 30 - 99 U/L    Total Bilirubin 0.3 0.1 - 1.5 mg/dL    Albumin 4.0 3.2 - 4.9 g/dL    Total Protein 6.1 6.0 - 8.2 g/dL    Globulin 2.1 1.9 - 3.5 g/dL    A-G Ratio 1.9 g/dL   RH TYPE FOR RHOGAM FROM E.D.   Result Value Ref Range    Emergency Department Rh Typing POS     Number Of Rh Doses Indicated ZERO    HCG QUANTITATIVE   Result Value Ref Range    Bhcg <1.0 0.0 - 5.0 mIU/mL   URINALYSIS,CULTURE IF INDICATED    Specimen: Urine    Result Value Ref Range    Color Yellow     Character Clear     Specific Gravity 1.021 <1.035    Ph 5.0 5.0 - 8.0    Glucose Negative Negative mg/dL    Ketones 15 (A) Negative mg/dL    Protein Negative Negative mg/dL    Bilirubin Negative Negative    Urobilinogen, Urine 0.2 Negative    Nitrite Negative Negative    Leukocyte Esterase Negative Negative    Occult Blood Large (A) Negative    Micro Urine Req Microscopic    ESTIMATED GFR   Result Value Ref Range    GFR (CKD-EPI) 130 >60 mL/min/1.73 m 2   URINE MICROSCOPIC (W/UA)   Result Value Ref Range    WBC 0-2 /hpf    RBC  (A) /hpf    Bacteria Negative None /hpf    Epithelial Cells Negative /hpf    Hyaline Cast 0-2 /lpf        RADIOLOGY  The radiologist's interpretation of all radiological studies have been reviewed by me.     US-PELVIC COMPLETE (TRANSABDOMINAL/TRANSVAGINAL) (COMBO)   Final Result      Unremarkable transvaginal appearance of the pelvis.             COURSE & MEDICAL DECISION MAKING  Nursing notes, VS, PMSFHx reviewed in chart.     Review of past medical records shows the patient was last seen at urgent care on October 26, 2022 for flank pain, vaginal discharge and nausea and had negative urine pregnancy test without overt signs of infection on UA while she was positive for Candida and Gardnerella and negative for chlamydia, gonorrhea and trichomonas.      Differential diagnoses considered include but are not limited to: Pregnancy, ectopic, Ab/miscarriage, fetal demise, ovarian cyst/torsion, endometriosis, STI, PID, cervicitis, vaginitis, DUB, appendicitis      History and physical exam as above.  Patient presents with vaginal bleeding and lower abdominal cramping with recent positive pregnancy tests.  However, her hCG level today is 0.  Pelvic ultrasound unremarkable.  No evidence for anemia.  Blood in the urine likely from vaginal source.  She declined pelvic exam.  I discussed findings with patient and mother.  Unclear if she had miscarriage  but unlikely to cause 0 hCG level due to her symptoms starting today.  There is no evidence for acute surgical abdomen.  I discussed with them outpatient follow-up and return to ED precautions.  Patient and mother verbalized understanding and agreed with plan of care with no further questions or concerns.      The patient is referred to a primary physician for blood pressure management, diabetic screening, and for all other preventative health concerns.       FINAL IMPRESSION  1. Vaginal bleeding Acute   2. Abdominal cramping Acute          DISPOSITION  Patient will be discharged home in stable condition.       FOLLOW UP  Please follow-up with your doctor    Call today      [facility], Emergency Dept  [address] [place] [id number] [phone]    If symptoms worsen       OUTPATIENT MEDICATIONS  New Prescriptions    No medications on file          Electronically signed by: Alvin Engle D.O., 11/18/2022 12:23 AM      Portions of this record were made with voice recognition software.  Despite my review, spelling/grammar/context errors may still remain.  Interpretation of this chart should be taken in this context.

## 2022-11-18 NOTE — ED TRIAGE NOTES
"Chief Complaint   Patient presents with    Vaginal Bleeding     Arrives with c/o vaginal bleeding, cramping since 10AM, also states she noticed some tissue in the blood  Pt unsure of amount of blood loss but states it was a heavy,steady amount  States approx 7-8 wks into pregnancy  States hx of miscarriage 2 yrs ago           /81   Pulse (!) 113   Temp 36.4 °C (97.6 °F) (Temporal)   Resp 19   Ht 1.651 m (5' 5\")   Wt 66 kg (145 lb 8.1 oz)   LMP  (LMP Unknown) Comment: 7-8wks pregnant, current bleeding  SpO2 99%   BMI 24.21 kg/m²     Pt made aware of triage process, placed back into lobby, educated pt to tell staff of any worsening of symptoms     "

## 2022-12-02 ENCOUNTER — HOSPITAL ENCOUNTER (EMERGENCY)
Facility: MEDICAL CENTER | Age: 22
End: 2022-12-02
Attending: EMERGENCY MEDICINE
Payer: COMMERCIAL

## 2022-12-02 ENCOUNTER — APPOINTMENT (OUTPATIENT)
Dept: RADIOLOGY | Facility: MEDICAL CENTER | Age: 22
End: 2022-12-02
Attending: EMERGENCY MEDICINE
Payer: COMMERCIAL

## 2022-12-02 VITALS
WEIGHT: 148.15 LBS | HEART RATE: 86 BPM | BODY MASS INDEX: 24.68 KG/M2 | DIASTOLIC BLOOD PRESSURE: 81 MMHG | SYSTOLIC BLOOD PRESSURE: 138 MMHG | HEIGHT: 65 IN | RESPIRATION RATE: 18 BRPM | OXYGEN SATURATION: 97 % | TEMPERATURE: 98.8 F

## 2022-12-02 DIAGNOSIS — J11.1 INFLUENZA-LIKE ILLNESS: ICD-10-CM

## 2022-12-02 DIAGNOSIS — J02.9 SORE THROAT: ICD-10-CM

## 2022-12-02 DIAGNOSIS — R05.1 ACUTE COUGH: ICD-10-CM

## 2022-12-02 LAB
FLUAV RNA SPEC QL NAA+PROBE: NEGATIVE
FLUBV RNA SPEC QL NAA+PROBE: NEGATIVE
RSV RNA SPEC QL NAA+PROBE: POSITIVE
SARS-COV-2 RNA RESP QL NAA+PROBE: NOTDETECTED
SPECIMEN SOURCE: ABNORMAL

## 2022-12-02 PROCEDURE — 71045 X-RAY EXAM CHEST 1 VIEW: CPT

## 2022-12-02 PROCEDURE — 99283 EMERGENCY DEPT VISIT LOW MDM: CPT

## 2022-12-02 PROCEDURE — 0241U HCHG SARS-COV-2 COVID-19 NFCT DS RESP RNA 4 TRGT MIC: CPT

## 2022-12-02 PROCEDURE — C9803 HOPD COVID-19 SPEC COLLECT: HCPCS | Performed by: EMERGENCY MEDICINE

## 2022-12-02 RX ORDER — BENZONATATE 100 MG/1
100 CAPSULE ORAL 3 TIMES DAILY PRN
Qty: 20 CAPSULE | Refills: 0 | Status: SHIPPED | OUTPATIENT
Start: 2022-12-02

## 2022-12-02 ASSESSMENT — LIFESTYLE VARIABLES
DO YOU DRINK ALCOHOL: NO
DOES PATIENT WANT TO STOP DRINKING: NO

## 2022-12-02 ASSESSMENT — FIBROSIS 4 INDEX: FIB4 SCORE: 0.3

## 2022-12-02 NOTE — ED PROVIDER NOTES
ED Physician Note    Chief Concern:   Cough, sore throat    HPI:  Mariah Muhammad is a very pleasant 22-year-old woman who presents to the emergency department today for evaluation of flulike symptoms including cough, sore throat, and some shortness of breath.  She reports no significant past medical history aside from a history of anemia.  Her symptoms began yesterday, she describes sore throat followed by cough.  Her cough seemed to progressively worsen throughout the day today, and she had 1 episode shortly prior to arrival where she coughed up a very small amount of blood.  She describes this is a very small amount of blood mixed with some sputum, this did not continue, she has had no further episodes.  She told someone at work who recommended that she go to the emergency department.  She has had elected to go to urgent care, however they told her that because she coughed up blood she had to come to the emergency department for evaluation.  On arrival she is well-appearing, she has no tachycardia, no hypotension.  She reports no history of DVT nor pulmonary embolism.  She does not report any history of fever, she reports no longer using a combination contraceptive patch, no risk factors for DVT nor pulmonary embolism.  She is had no associated nausea or vomiting, no lightheadedness, she has had some mild shortness of breath only while coughing.  She reports no prior history of asthma or other pulmonary disease.    Review of Systems:  See HPI for pertinent positives and negatives.     Past Medical History:   has a past medical history of Anemia and Eczema.    Social History:  Social History     Tobacco Use    Smoking status: Never    Smokeless tobacco: Never   Vaping Use    Vaping Use: Never used   Substance and Sexual Activity    Alcohol use: Never    Drug use: Never    Sexual activity: Not on file       Surgical History:   has a past surgical history that includes tonsillectomy; tonsillectomy; and  "adenoidectomy.    Current Medications:  Home Medications       Reviewed by Umesh Camp R.N. (Registered Nurse) on 12/02/22 at 1042  Med List Status: Not Addressed     Medication Last Dose Status   ALPRAZolam (XANAX) 0.5 MG Tab  Active   cyclobenzaprine (FLEXERIL) 5 mg tablet  Active   cyclobenzaprine (FLEXERIL) 5 mg tablet  Active   fluconazole (DIFLUCAN) 150 MG tablet  Active   Levonorgestrel-Eth Estradiol (TWIRLA) 120-30 MCG/24HR PATCH WEEKLY  Active   mirtazapine (REMERON) 15 MG Tab  Active   ondansetron (ZOFRAN ODT) 4 MG TABLET DISPERSIBLE  Active                    Allergies:  No Known Allergies    Physical Exam:  Vital Signs: /81   Pulse 86   Temp 37.1 °C (98.8 °F) (Temporal)   Resp 18   Ht 1.651 m (5' 5\")   Wt 67.2 kg (148 lb 2.4 oz)   LMP  (LMP Unknown) Comment: 7-8wks pregnant, current bleeding  SpO2 97%   BMI 24.65 kg/m²   Constitutional: Alert, no acute distress  HEENT: Normal-appearing posterior oropharynx that may be very minimally inflamed, no patchy exudates, uvula midline  Eyes: Pupils equal and reactive, normal conjunctiva  Neck: Supple, normal range of motion, no stridor  Cardiovascular: Extremities are warm and well perfused, no murmur appreciated, normal cardiac auscultation  Pulmonary: Breath sounds clear and equal bilaterally, no wheezing, no coarse breath sounds, room air oxygen saturation is 99%  Skin: Warm, dry, no rashes or lesions  Musculoskeletal: Normal range of motion in all extremities, no swelling or deformity noted  Neurologic: Alert, oriented, normal speech, normal motor function    Medical records reviewed for continuity of care. No prior medical records available for review from urgent care.  Ms. Muhammad was seen in this emergency department 11/18/2022 for unrelated symptoms, she was seen for evaluation of vaginal bleeding and lower abdominal cramping with pregnancy.  Transvaginal ultrasound was unremarkable.  hCG level was 0.      Labs:  Labs Reviewed "   COV-2, FLU A/B, AND RSV BY PCR ([facility]) - Abnormal; Notable for the following components:       Result Value    RSV, PCR POSITIVE (*)     All other components within normal limits       Radiology:  DX-CHEST-PORTABLE (1 VIEW)   Final Result      1.  No acute cardiac or pulmonary abnormalities are identified.           ED Medications Administered:  Medications - No data to display    Differential diagnosis:  Viral illness, including COVID-19, influenza, RSV, other viral illness, bacterial pneumonia, history and physical exam are less concerning for pulmonary embolism    MDM:  Ms. Muhammad presents to the emergency department for upper respiratory symptoms including sore throat and cough, sent to the emergency department from urgent care because she reported coughing up a very small amount of blood mixed in sputum.  This only occurred once, and it was very small amount.  She does not report any recurrent hemoptysis, no hematemesis.  On arrival her vital signs are reassuring, she has no tachycardia, no hypotension, no hypoxia, she is afebrile.  No evidence of Sirs or sepsis with regard to her vital signs.  She is PERC negative, less concerning for pulmonary embolism.    Chest x-ray is negative for evidence of bacterial pneumonia.  No abnormalities identified.  She did test positive for RSV which would explain her symptoms.  She remained well-appearing throughout her stay in the emergency department, plan at this time is for discharge home with close outpatient follow-up.  She is counseled to discuss her emergency department visit with her primary care clinic. Return precautions were discussed with the patient, and provided in written form with the patient's discharge instructions.     Disposition:  Discharge home in stable condition    Final Impression:  1. Sore throat    2. Acute cough    3. Influenza-like illness        Electronically signed by Pauline Islas MD

## 2022-12-02 NOTE — Clinical Note
Mariah Muhammad was seen and treated in our emergency department on 12/2/2022.  She may return to work on 12/05/2022.  Able to return to work when symptoms are improved     If you have any questions or concerns, please don't hesitate to call.      Pauline Islas M.D.

## 2022-12-02 NOTE — ED TRIAGE NOTES
Chief Complaint   Patient presents with    Sore Throat     Since yesterday, worsening today with cough and hemoptysis    Sent from Urgent Care     Due to blood in sputum.      Pt ambulatory to triage for above complaint. History of anemia. VSS.

## 2022-12-02 NOTE — DISCHARGE INSTRUCTIONS
You will be able to receive your influenza, COVID-19, and RSV test results using the [employer] jennifer.  As soon as those results are available, you will get a notification through the jennifer.  You may treat your symptoms with Tylenol and ibuprofen according to label instructions.  Please call your primary care clinic at the opening of business hours to discuss your emergency department visit, as well as your symptoms to schedule a follow-up visit if needed.  If this is a viral illness, symptoms may last 5 to 7 days, and you may have a dry cough for 2 weeks after other symptoms improve.  You may continue to have intermittent fevers, however they should get better with Tylenol or ibuprofen, and you should feel better when the fever is improved.  They may continue to return for 2 to 3 days, though your symptoms should not significantly worsen.  It is very rare, but viral illnesses can become more severe.  For this reason it is very important that you return to the emergency department if you feel as though your symptoms are steadily worsening, if you have severe shortness of breath vomiting, vomiting to the point where you are not able to tolerate food or fluids, lightheadedness, fevers that do not respond to Tylenol or ibuprofen, or if you have any further concerns.  Additionally, please return if your symptoms are not improving, and you are not able to get in touch with your primary care clinic.